# Patient Record
Sex: MALE | Race: WHITE | Employment: UNEMPLOYED | URBAN - METROPOLITAN AREA
[De-identification: names, ages, dates, MRNs, and addresses within clinical notes are randomized per-mention and may not be internally consistent; named-entity substitution may affect disease eponyms.]

---

## 2018-02-16 ENCOUNTER — HOSPITAL ENCOUNTER (EMERGENCY)
Age: 38
Discharge: HOME | End: 2018-02-17
Attending: EMERGENCY MEDICINE

## 2018-02-16 ENCOUNTER — APPOINTMENT (OUTPATIENT)
Dept: GENERAL RADIOLOGY | Age: 38
End: 2018-02-16

## 2018-02-16 DIAGNOSIS — T40.1X1A ACCIDENTAL OVERDOSE OF HEROIN, INITIAL ENCOUNTER (HCC): Primary | ICD-10-CM

## 2018-02-16 LAB
ABSOLUTE EOS #: 0.1 K/UL (ref 0–0.4)
ABSOLUTE IMMATURE GRANULOCYTE: ABNORMAL K/UL (ref 0–0.3)
ABSOLUTE LYMPH #: 2.5 K/UL (ref 1–4.8)
ABSOLUTE MONO #: 0.6 K/UL (ref 0.2–0.8)
BASOPHILS # BLD: 1 % (ref 0–2)
BASOPHILS ABSOLUTE: 0.1 K/UL (ref 0–0.2)
DIFFERENTIAL TYPE: ABNORMAL
EKG ATRIAL RATE: 103 BPM
EKG P AXIS: 45 DEGREES
EKG P-R INTERVAL: 156 MS
EKG Q-T INTERVAL: 356 MS
EKG QRS DURATION: 104 MS
EKG QTC CALCULATION (BAZETT): 466 MS
EKG R AXIS: 16 DEGREES
EKG T AXIS: 47 DEGREES
EKG VENTRICULAR RATE: 103 BPM
EOSINOPHILS RELATIVE PERCENT: 2 % (ref 1–4)
HCT VFR BLD CALC: 48.2 % (ref 41–53)
HEMOGLOBIN: 15.7 G/DL (ref 13.5–17.5)
IMMATURE GRANULOCYTES: ABNORMAL %
LYMPHOCYTES # BLD: 35 % (ref 24–44)
MCH RBC QN AUTO: 27.8 PG (ref 26–34)
MCHC RBC AUTO-ENTMCNC: 32.5 G/DL (ref 31–37)
MCV RBC AUTO: 85.6 FL (ref 80–100)
MONOCYTES # BLD: 8 % (ref 1–7)
NRBC AUTOMATED: ABNORMAL PER 100 WBC
PDW BLD-RTO: 13.9 % (ref 11.5–14.5)
PLATELET # BLD: 185 K/UL (ref 130–400)
PLATELET ESTIMATE: ABNORMAL
PMV BLD AUTO: ABNORMAL FL (ref 6–12)
RBC # BLD: 5.64 M/UL (ref 4.5–5.9)
RBC # BLD: ABNORMAL 10*6/UL
SEG NEUTROPHILS: 54 % (ref 36–66)
SEGMENTED NEUTROPHILS ABSOLUTE COUNT: 3.9 K/UL (ref 1.8–7.7)
WBC # BLD: 7.2 K/UL (ref 3.5–11)
WBC # BLD: ABNORMAL 10*3/UL

## 2018-02-16 PROCEDURE — 93005 ELECTROCARDIOGRAM TRACING: CPT

## 2018-02-16 PROCEDURE — 82553 CREATINE MB FRACTION: CPT

## 2018-02-16 PROCEDURE — 71045 X-RAY EXAM CHEST 1 VIEW: CPT

## 2018-02-16 PROCEDURE — 99285 EMERGENCY DEPT VISIT HI MDM: CPT

## 2018-02-16 PROCEDURE — 85025 COMPLETE CBC W/AUTO DIFF WBC: CPT

## 2018-02-16 PROCEDURE — 80048 BASIC METABOLIC PNL TOTAL CA: CPT

## 2018-02-17 VITALS
DIASTOLIC BLOOD PRESSURE: 51 MMHG | HEIGHT: 77 IN | OXYGEN SATURATION: 95 % | BODY MASS INDEX: 27.16 KG/M2 | SYSTOLIC BLOOD PRESSURE: 117 MMHG | WEIGHT: 230 LBS | HEART RATE: 90 BPM | TEMPERATURE: 98.1 F | RESPIRATION RATE: 18 BRPM

## 2018-02-17 LAB
ANION GAP SERPL CALCULATED.3IONS-SCNC: 18 MMOL/L (ref 9–17)
BUN BLDV-MCNC: 9 MG/DL (ref 6–20)
BUN/CREAT BLD: 8 (ref 9–20)
CALCIUM SERPL-MCNC: 8.7 MG/DL (ref 8.6–10.4)
CHLORIDE BLD-SCNC: 102 MMOL/L (ref 98–107)
CK MB: 1.2 NG/ML
CO2: 24 MMOL/L (ref 20–31)
CREAT SERPL-MCNC: 1.11 MG/DL (ref 0.7–1.2)
GFR AFRICAN AMERICAN: >60 ML/MIN
GFR NON-AFRICAN AMERICAN: >60 ML/MIN
GFR SERPL CREATININE-BSD FRML MDRD: ABNORMAL ML/MIN/{1.73_M2}
GFR SERPL CREATININE-BSD FRML MDRD: ABNORMAL ML/MIN/{1.73_M2}
GLUCOSE BLD-MCNC: 149 MG/DL (ref 70–99)
POTASSIUM SERPL-SCNC: 4 MMOL/L (ref 3.7–5.3)
SODIUM BLD-SCNC: 144 MMOL/L (ref 135–144)

## 2018-02-17 NOTE — ED NOTES
Pt presents to ED via EMS with steady gait transferring to cart c/o of heroin overdose. [place] un[place]. [facility] gave 8mg Narcan IN. Pt became A&O. Upon arrival to ED, pt is A&Ox4. PERRLA. 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 Pt states this is his first time using in over 6 months. Pt states ETOH on board. Pt denie n/v/d/c. Pt denies chest pain or SOB. Respirations are even and non-labored. Skin is pink, warm, and dry. Lungs clear bilateral. Pt placed on 2L O2 NC.       J Carlos Monsivais, RN  02/16/18 [id number]

## 2018-08-20 ENCOUNTER — APPOINTMENT (OUTPATIENT)
Dept: GENERAL RADIOLOGY | Age: 38
End: 2018-08-20

## 2018-08-20 ENCOUNTER — HOSPITAL ENCOUNTER (EMERGENCY)
Age: 38
Discharge: HOME | End: 2018-08-20
Attending: EMERGENCY MEDICINE

## 2018-08-20 VITALS
DIASTOLIC BLOOD PRESSURE: 90 MMHG | HEIGHT: 77 IN | OXYGEN SATURATION: 98 % | SYSTOLIC BLOOD PRESSURE: 142 MMHG | HEART RATE: 86 BPM | WEIGHT: 208 LBS | TEMPERATURE: 98.5 F | BODY MASS INDEX: 24.56 KG/M2 | RESPIRATION RATE: 16 BRPM

## 2018-08-20 DIAGNOSIS — R07.89 CHEST WALL PAIN: Primary | ICD-10-CM

## 2018-08-20 PROCEDURE — 99284 EMERGENCY DEPT VISIT MOD MDM: CPT

## 2018-08-20 PROCEDURE — 6360000002 HC RX W HCPCS: Performed by: NURSE PRACTITIONER

## 2018-08-20 PROCEDURE — 71046 X-RAY EXAM CHEST 2 VIEWS: CPT

## 2018-08-20 PROCEDURE — 96372 THER/PROPH/DIAG INJ SC/IM: CPT

## 2018-08-20 RX ORDER — IBUPROFEN 800 MG/1
800 TABLET ORAL EVERY 8 HOURS PRN
Qty: 20 TABLET | Refills: 0 | Status: SHIPPED | OUTPATIENT
Start: 2018-08-20 | End: 2019-02-11 | Stop reason: ALTCHOICE

## 2018-08-20 RX ORDER — KETOROLAC TROMETHAMINE 30 MG/ML
60 INJECTION, SOLUTION INTRAMUSCULAR; INTRAVENOUS ONCE
Status: COMPLETED | OUTPATIENT
Start: 2018-08-20 | End: 2018-08-20

## 2018-08-20 RX ORDER — ORPHENADRINE CITRATE 30 MG/ML
60 INJECTION INTRAMUSCULAR; INTRAVENOUS ONCE
Status: COMPLETED | OUTPATIENT
Start: 2018-08-20 | End: 2018-08-20

## 2018-08-20 RX ORDER — HYDROCODONE BITARTRATE AND ACETAMINOPHEN 5; 325 MG/1; MG/1
1 TABLET ORAL EVERY 6 HOURS PRN
Qty: 12 TABLET | Refills: 0 | Status: SHIPPED | OUTPATIENT
Start: 2018-08-20 | End: 2018-08-23

## 2018-08-20 RX ORDER — NAPROXEN 500 MG/1
500 TABLET ORAL PRN
Status: ON HOLD | COMMUNITY
End: 2018-09-07 | Stop reason: HOSPADM

## 2018-08-20 RX ORDER — METHOCARBAMOL 750 MG/1
750 TABLET, FILM COATED ORAL 3 TIMES DAILY PRN
Qty: 30 TABLET | Refills: 0 | Status: SHIPPED | OUTPATIENT
Start: 2018-08-20 | End: 2018-08-30

## 2018-08-20 RX ADMIN — KETOROLAC TROMETHAMINE 60 MG: 30 INJECTION, SOLUTION INTRAMUSCULAR at 19:33

## 2018-08-20 RX ADMIN — ORPHENADRINE CITRATE 60 MG: 30 INJECTION INTRAMUSCULAR; INTRAVENOUS at 19:32

## 2018-08-20 ASSESSMENT — PAIN DESCRIPTION - PAIN TYPE: TYPE: ACUTE PAIN

## 2018-08-20 ASSESSMENT — PAIN DESCRIPTION - PROGRESSION: CLINICAL_PROGRESSION: GRADUALLY WORSENING

## 2018-08-20 ASSESSMENT — PAIN DESCRIPTION - DESCRIPTORS: DESCRIPTORS: SHARP

## 2018-08-20 ASSESSMENT — PAIN DESCRIPTION - ONSET: ONSET: ON-GOING

## 2018-08-20 ASSESSMENT — PAIN SCALES - GENERAL
PAINLEVEL_OUTOF10: 4
PAINLEVEL_OUTOF10: 6

## 2018-08-20 ASSESSMENT — PAIN DESCRIPTION - LOCATION: LOCATION: CHEST

## 2018-08-20 ASSESSMENT — PAIN DESCRIPTION - ORIENTATION: ORIENTATION: MID

## 2018-08-20 ASSESSMENT — PAIN DESCRIPTION - FREQUENCY: FREQUENCY: CONTINUOUS

## 2018-08-20 NOTE — ED PROVIDER NOTES
Concern    None     Social History Narrative    None         REVIEW OF SYSTEMS    (2-9 systems for level 4, 10 or more for level 5)     Review of Systems   Cardiovascular: Positive for chest pain. All other systems reviewed and are negative. Except as noted above the remainder of the review of systems was reviewed and negative. PHYSICAL EXAM    (up to 7 for level 4, 8 or more for level 5)     ED Triage Vitals [08/20/18 1826]   BP Temp Temp Source Pulse Resp SpO2 Height Weight   (!) 143/97 98.5 °F (36.9 °C) Oral 86 16 98 % 6' 5\" (1.956 m) 208 lb (94.3 kg)       Physical Exam   Constitutional: He is oriented to person, place, and time. He appears well-developed and well-nourished. HENT:   Head: Normocephalic and atraumatic. Right Ear: External ear normal.   Left Ear: External ear normal.   Nose: Nose normal.   Mouth/Throat: Oropharynx is clear and moist.   Eyes: Pupils are equal, round, and reactive to light. Conjunctivae and EOM are normal.   Neck: Normal range of motion. Neck supple. Pulmonary/Chest: Effort normal and breath sounds normal. He has no decreased breath sounds. He has no wheezes. He has no rhonchi. He exhibits tenderness. He exhibits no crepitus and no edema. Musculoskeletal: Normal range of motion. Neurological: He is alert and oriented to person, place, and time. He has normal strength and normal reflexes. No cranial nerve deficit or sensory deficit. Skin: Skin is warm, dry and intact. No ecchymosis and no rash noted. No erythema. Psychiatric: He has a normal mood and affect.  His behavior is normal. Judgment and thought content normal.         DIAGNOSTIC RESULTS     EKG: All EKG's are interpreted by the Emergency Department Physician who either signs or Co-signs this chart in the absence of a cardiologist.        RADIOLOGY:   Non-plain film images such as CT, Ultrasound and MRI are read by the radiologist. Plain radiographic images are visualized and preliminarily interpreted by the emergency physician with the below findings:    Xr Chest Standard (2 Vw)    Result Date: 8/20/2018  EXAMINATION: TWO VIEWS OF THE CHEST 8/20/2018 7:19 pm COMPARISON: 18 February 2018 HISTORY: ORDERING SYSTEM PROVIDED HISTORY: Chest wall pain ×10 days, accidentally struck in chest TECHNOLOGIST PROVIDED HISTORY: Chest wall pain Ã10 days, accidentally struck in chest Ordering Physician Provided Reason for Exam: chest pain Acuity: Acute Type of Exam: Initial FINDINGS: Heart size is normal.  No vascular congestion, focal consolidation, effusion, or pneumothorax is noted. Osseous and mediastinal structures are age-appropriate. No evidence of acute cardiopulmonary disease. Interpretation per the Radiologist below, if available at the time of this note:    XR CHEST STANDARD (2 VW)   Final Result   No evidence of acute cardiopulmonary disease. ED BEDSIDE ULTRASOUND:   Performed by ED Physician - none    LABS:  Labs Reviewed - No data to display    All other labs were within normal range or not returned as of this dictation. EMERGENCY DEPARTMENT COURSE and DIFFERENTIAL DIAGNOSIS/MDM:   Patient was evaluated in conjunction with attending physician. He exhibits chest wall tenderness palpation to the right chest area. There is no crepitus, erythema or ecchymosis. X-ray of chest per radiologist shows no evidence of acute cardiopulmonary disease. She was given Toradol and Norflex in the ED. He'll be discharged with Motrin, Robaxin and Norco for pain. He was instructed to call number provided to schedule appointment with a [address] [place] [address] primary care physician. Return to ED as needed. Vitals:    Vitals:    08/20/18 1826 08/20/18 1956   BP: (!) 143/97 (!) 142/90   Pulse: 86    Resp: 16    Temp: 98.5 °F (36.9 °C)    TempSrc: Oral    SpO2: 98%    Weight: 208 lb (94.3 kg)    Height: 6' 5\" (1.956 m)          CONSULTS:  None    PROCEDURES:  Procedures    FINAL IMPRESSION      1.  Chest

## 2018-09-04 ENCOUNTER — APPOINTMENT (OUTPATIENT)
Dept: CT IMAGING | Age: 38
DRG: 603 | End: 2018-09-04

## 2018-09-04 ENCOUNTER — HOSPITAL ENCOUNTER (INPATIENT)
Age: 38
LOS: 2 days | Discharge: HOME | DRG: 603 | End: 2018-09-07
Attending: EMERGENCY MEDICINE | Admitting: INTERNAL MEDICINE

## 2018-09-04 DIAGNOSIS — L02.512 ABSCESS OF HAND, LEFT: Primary | ICD-10-CM

## 2018-09-04 DIAGNOSIS — L03.119 CELLULITIS OF HAND: ICD-10-CM

## 2018-09-04 LAB
ANION GAP SERPL CALCULATED.3IONS-SCNC: 8 MMOL/L (ref 9–17)
BUN BLDV-MCNC: 13 MG/DL (ref 6–20)
BUN/CREAT BLD: 17 (ref 9–20)
CALCIUM SERPL-MCNC: 9.5 MG/DL (ref 8.6–10.4)
CHLORIDE BLD-SCNC: 93 MMOL/L (ref 98–107)
CO2: 34 MMOL/L (ref 20–31)
CREAT SERPL-MCNC: 0.77 MG/DL (ref 0.7–1.2)
GFR AFRICAN AMERICAN: >60 ML/MIN
GFR NON-AFRICAN AMERICAN: >60 ML/MIN
GFR SERPL CREATININE-BSD FRML MDRD: ABNORMAL ML/MIN/{1.73_M2}
GFR SERPL CREATININE-BSD FRML MDRD: ABNORMAL ML/MIN/{1.73_M2}
GLUCOSE BLD-MCNC: 101 MG/DL (ref 70–99)
LACTIC ACID: 1.1 MMOL/L (ref 0.5–2.2)
POTASSIUM SERPL-SCNC: 3.6 MMOL/L (ref 3.7–5.3)
SODIUM BLD-SCNC: 135 MMOL/L (ref 135–144)

## 2018-09-04 PROCEDURE — 2500000003 HC RX 250 WO HCPCS: Performed by: EMERGENCY MEDICINE

## 2018-09-04 PROCEDURE — 99284 EMERGENCY DEPT VISIT MOD MDM: CPT

## 2018-09-04 PROCEDURE — 96365 THER/PROPH/DIAG IV INF INIT: CPT

## 2018-09-04 PROCEDURE — 87205 SMEAR GRAM STAIN: CPT

## 2018-09-04 PROCEDURE — 86140 C-REACTIVE PROTEIN: CPT

## 2018-09-04 PROCEDURE — 2580000003 HC RX 258: Performed by: EMERGENCY MEDICINE

## 2018-09-04 PROCEDURE — 6360000004 HC RX CONTRAST MEDICATION: Performed by: EMERGENCY MEDICINE

## 2018-09-04 PROCEDURE — 90715 TDAP VACCINE 7 YRS/> IM: CPT | Performed by: EMERGENCY MEDICINE

## 2018-09-04 PROCEDURE — 83605 ASSAY OF LACTIC ACID: CPT

## 2018-09-04 PROCEDURE — 87186 SC STD MICRODIL/AGAR DIL: CPT

## 2018-09-04 PROCEDURE — 87040 BLOOD CULTURE FOR BACTERIA: CPT

## 2018-09-04 PROCEDURE — 80048 BASIC METABOLIC PNL TOTAL CA: CPT

## 2018-09-04 PROCEDURE — 90471 IMMUNIZATION ADMIN: CPT | Performed by: EMERGENCY MEDICINE

## 2018-09-04 PROCEDURE — 87070 CULTURE OTHR SPECIMN AEROBIC: CPT

## 2018-09-04 PROCEDURE — 73201 CT UPPER EXTREMITY W/DYE: CPT

## 2018-09-04 PROCEDURE — 87077 CULTURE AEROBIC IDENTIFY: CPT

## 2018-09-04 PROCEDURE — 96375 TX/PRO/DX INJ NEW DRUG ADDON: CPT

## 2018-09-04 PROCEDURE — 6360000002 HC RX W HCPCS: Performed by: EMERGENCY MEDICINE

## 2018-09-04 PROCEDURE — 87149 DNA/RNA DIRECT PROBE: CPT

## 2018-09-04 PROCEDURE — 85025 COMPLETE CBC W/AUTO DIFF WBC: CPT

## 2018-09-04 RX ORDER — 0.9 % SODIUM CHLORIDE 0.9 %
80 INTRAVENOUS SOLUTION INTRAVENOUS ONCE
Status: COMPLETED | OUTPATIENT
Start: 2018-09-04 | End: 2018-09-04

## 2018-09-04 RX ORDER — ONDANSETRON 2 MG/ML
4 INJECTION INTRAMUSCULAR; INTRAVENOUS ONCE
Status: COMPLETED | OUTPATIENT
Start: 2018-09-04 | End: 2018-09-04

## 2018-09-04 RX ORDER — SODIUM CHLORIDE 0.9 % (FLUSH) 0.9 %
10 SYRINGE (ML) INJECTION PRN
Status: DISCONTINUED | OUTPATIENT
Start: 2018-09-04 | End: 2018-09-05

## 2018-09-04 RX ORDER — MORPHINE SULFATE 4 MG/ML
4 INJECTION, SOLUTION INTRAMUSCULAR; INTRAVENOUS ONCE
Status: COMPLETED | OUTPATIENT
Start: 2018-09-04 | End: 2018-09-04

## 2018-09-04 RX ADMIN — TETANUS IMMUNE GLOBULIN (HUMAN) 250 UNITS: 250 INJECTION INTRAMUSCULAR at 23:36

## 2018-09-04 RX ADMIN — IOPAMIDOL 75 ML: 755 INJECTION, SOLUTION INTRAVENOUS at 23:17

## 2018-09-04 RX ADMIN — ONDANSETRON 4 MG: 2 INJECTION INTRAMUSCULAR; INTRAVENOUS at 23:00

## 2018-09-04 RX ADMIN — TETANUS TOXOID, REDUCED DIPHTHERIA TOXOID AND ACELLULAR PERTUSSIS VACCINE, ADSORBED 0.5 ML: 5; 2.5; 8; 8; 2.5 SUSPENSION INTRAMUSCULAR at 23:00

## 2018-09-04 RX ADMIN — MORPHINE SULFATE 4 MG: 4 INJECTION, SOLUTION INTRAMUSCULAR; INTRAVENOUS at 23:00

## 2018-09-04 RX ADMIN — SODIUM CHLORIDE 80 ML: 9 INJECTION, SOLUTION INTRAVENOUS at 23:17

## 2018-09-04 RX ADMIN — HYDROMORPHONE HYDROCHLORIDE 1 MG: 1 INJECTION, SOLUTION INTRAMUSCULAR; INTRAVENOUS; SUBCUTANEOUS at 23:43

## 2018-09-04 RX ADMIN — TAZOBACTAM SODIUM AND PIPERACILLIN SODIUM 3.38 G: 375; 3 INJECTION, SOLUTION INTRAVENOUS at 23:36

## 2018-09-04 RX ADMIN — Medication 10 ML: at 23:17

## 2018-09-04 ASSESSMENT — PAIN DESCRIPTION - ORIENTATION: ORIENTATION: LEFT

## 2018-09-04 ASSESSMENT — PAIN SCALES - GENERAL
PAINLEVEL_OUTOF10: 7

## 2018-09-04 ASSESSMENT — PAIN DESCRIPTION - PAIN TYPE: TYPE: ACUTE PAIN

## 2018-09-04 ASSESSMENT — PAIN DESCRIPTION - LOCATION: LOCATION: HAND

## 2018-09-05 PROBLEM — L03.90 CELLULITIS: Status: ACTIVE | Noted: 2018-09-05

## 2018-09-05 PROBLEM — L02.519 CELLULITIS AND ABSCESS OF HAND: Status: ACTIVE | Noted: 2018-09-05

## 2018-09-05 PROBLEM — F17.200 SMOKER: Status: ACTIVE | Noted: 2018-09-05

## 2018-09-05 PROBLEM — D72.829 LEUKOCYTOSIS: Status: ACTIVE | Noted: 2018-09-05

## 2018-09-05 PROBLEM — M71.042 ABSCESS OF BURSA OF LEFT HAND: Status: ACTIVE | Noted: 2018-09-05

## 2018-09-05 PROBLEM — L03.119 CELLULITIS AND ABSCESS OF HAND: Status: ACTIVE | Noted: 2018-09-05

## 2018-09-05 PROBLEM — E87.6 HYPOKALEMIA: Status: ACTIVE | Noted: 2018-09-05

## 2018-09-05 LAB
ABSOLUTE EOS #: 0 K/UL (ref 0–0.4)
ABSOLUTE IMMATURE GRANULOCYTE: ABNORMAL K/UL (ref 0–0.3)
ABSOLUTE LYMPH #: 2.07 K/UL (ref 1–4.8)
ABSOLUTE MONO #: 1.43 K/UL (ref 0.2–0.8)
BASOPHILS # BLD: 0 %
BASOPHILS ABSOLUTE: 0 K/UL (ref 0–0.2)
C-REACTIVE PROTEIN: 124.2 MG/L (ref 0–5)
DIFFERENTIAL TYPE: ABNORMAL
DIRECT EXAM: NORMAL
EOSINOPHILS RELATIVE PERCENT: 0 % (ref 1–4)
HCT VFR BLD CALC: 44.2 % (ref 41–53)
HEMOGLOBIN: 14.3 G/DL (ref 13.5–17.5)
IMMATURE GRANULOCYTES: ABNORMAL %
LYMPHOCYTES # BLD: 13 % (ref 24–44)
Lab: NORMAL
MCH RBC QN AUTO: 27 PG (ref 26–34)
MCHC RBC AUTO-ENTMCNC: 32.4 G/DL (ref 31–37)
MCV RBC AUTO: 83.3 FL (ref 80–100)
MONOCYTES # BLD: 9 % (ref 1–7)
NRBC AUTOMATED: ABNORMAL PER 100 WBC
PDW BLD-RTO: 15.3 % (ref 11.5–14.5)
PLATELET # BLD: 303 K/UL (ref 130–400)
PLATELET ESTIMATE: ABNORMAL
PMV BLD AUTO: 8.1 FL (ref 6–12)
RBC # BLD: 5.31 M/UL (ref 4.5–5.9)
RBC # BLD: ABNORMAL 10*6/UL
SEG NEUTROPHILS: 78 % (ref 36–66)
SEGMENTED NEUTROPHILS ABSOLUTE COUNT: 12.4 K/UL (ref 1.8–7.7)
SPECIMEN DESCRIPTION: NORMAL
STATUS: NORMAL
VANCOMYCIN TROUGH DATE LAST DOSE: ABNORMAL
VANCOMYCIN TROUGH DOSE AMOUNT: ABNORMAL
VANCOMYCIN TROUGH TIME LAST DOSE: ABNORMAL
VANCOMYCIN TROUGH: 21.4 UG/ML (ref 10–20)
WBC # BLD: 15.9 K/UL (ref 3.5–11)
WBC # BLD: ABNORMAL 10*3/UL

## 2018-09-05 PROCEDURE — 99253 IP/OBS CNSLTJ NEW/EST LOW 45: CPT | Performed by: INTERNAL MEDICINE

## 2018-09-05 PROCEDURE — 6360000002 HC RX W HCPCS: Performed by: INTERNAL MEDICINE

## 2018-09-05 PROCEDURE — 1200000000 HC SEMI PRIVATE

## 2018-09-05 PROCEDURE — 6370000000 HC RX 637 (ALT 250 FOR IP): Performed by: NURSE PRACTITIONER

## 2018-09-05 PROCEDURE — 2580000003 HC RX 258: Performed by: NURSE PRACTITIONER

## 2018-09-05 PROCEDURE — 99222 1ST HOSP IP/OBS MODERATE 55: CPT | Performed by: INTERNAL MEDICINE

## 2018-09-05 PROCEDURE — 2500000003 HC RX 250 WO HCPCS: Performed by: NURSE PRACTITIONER

## 2018-09-05 PROCEDURE — 36415 COLL VENOUS BLD VENIPUNCTURE: CPT

## 2018-09-05 PROCEDURE — 6360000002 HC RX W HCPCS: Performed by: NURSE PRACTITIONER

## 2018-09-05 PROCEDURE — 96375 TX/PRO/DX INJ NEW DRUG ADDON: CPT

## 2018-09-05 PROCEDURE — 6360000002 HC RX W HCPCS: Performed by: EMERGENCY MEDICINE

## 2018-09-05 PROCEDURE — 80202 ASSAY OF VANCOMYCIN: CPT

## 2018-09-05 PROCEDURE — 2580000003 HC RX 258: Performed by: EMERGENCY MEDICINE

## 2018-09-05 RX ORDER — ONDANSETRON 4 MG/1
4 TABLET, ORALLY DISINTEGRATING ORAL EVERY 6 HOURS PRN
Status: DISCONTINUED | OUTPATIENT
Start: 2018-09-05 | End: 2018-09-07 | Stop reason: HOSPADM

## 2018-09-05 RX ORDER — POTASSIUM CHLORIDE 20MEQ/15ML
40 LIQUID (ML) ORAL PRN
Status: DISCONTINUED | OUTPATIENT
Start: 2018-09-05 | End: 2018-09-07 | Stop reason: HOSPADM

## 2018-09-05 RX ORDER — ONDANSETRON 2 MG/ML
4 INJECTION INTRAMUSCULAR; INTRAVENOUS EVERY 6 HOURS PRN
Status: DISCONTINUED | OUTPATIENT
Start: 2018-09-05 | End: 2018-09-07 | Stop reason: HOSPADM

## 2018-09-05 RX ORDER — POTASSIUM CHLORIDE 20 MEQ/1
40 TABLET, EXTENDED RELEASE ORAL PRN
Status: DISCONTINUED | OUTPATIENT
Start: 2018-09-05 | End: 2018-09-07 | Stop reason: HOSPADM

## 2018-09-05 RX ORDER — SODIUM CHLORIDE 0.9 % (FLUSH) 0.9 %
10 SYRINGE (ML) INJECTION EVERY 12 HOURS SCHEDULED
Status: DISCONTINUED | OUTPATIENT
Start: 2018-09-05 | End: 2018-09-07 | Stop reason: HOSPADM

## 2018-09-05 RX ORDER — BISACODYL 10 MG
10 SUPPOSITORY, RECTAL RECTAL DAILY PRN
Status: DISCONTINUED | OUTPATIENT
Start: 2018-09-05 | End: 2018-09-07 | Stop reason: HOSPADM

## 2018-09-05 RX ORDER — MAGNESIUM SULFATE 1 G/100ML
1 INJECTION INTRAVENOUS PRN
Status: DISCONTINUED | OUTPATIENT
Start: 2018-09-05 | End: 2018-09-07 | Stop reason: HOSPADM

## 2018-09-05 RX ORDER — ONDANSETRON 2 MG/ML
4 INJECTION INTRAMUSCULAR; INTRAVENOUS EVERY 6 HOURS PRN
Status: DISCONTINUED | OUTPATIENT
Start: 2018-09-05 | End: 2018-09-05 | Stop reason: SDUPTHER

## 2018-09-05 RX ORDER — ACETAMINOPHEN 325 MG/1
650 TABLET ORAL EVERY 4 HOURS PRN
Status: DISCONTINUED | OUTPATIENT
Start: 2018-09-05 | End: 2018-09-07 | Stop reason: HOSPADM

## 2018-09-05 RX ORDER — HYDROCODONE BITARTRATE AND ACETAMINOPHEN 5; 325 MG/1; MG/1
2 TABLET ORAL EVERY 4 HOURS PRN
Status: DISCONTINUED | OUTPATIENT
Start: 2018-09-05 | End: 2018-09-06

## 2018-09-05 RX ORDER — SODIUM CHLORIDE 9 MG/ML
INJECTION, SOLUTION INTRAVENOUS CONTINUOUS
Status: DISCONTINUED | OUTPATIENT
Start: 2018-09-05 | End: 2018-09-05

## 2018-09-05 RX ORDER — NICOTINE 21 MG/24HR
1 PATCH, TRANSDERMAL 24 HOURS TRANSDERMAL DAILY PRN
Status: DISCONTINUED | OUTPATIENT
Start: 2018-09-05 | End: 2018-09-07 | Stop reason: HOSPADM

## 2018-09-05 RX ORDER — SODIUM CHLORIDE 0.9 % (FLUSH) 0.9 %
10 SYRINGE (ML) INJECTION PRN
Status: DISCONTINUED | OUTPATIENT
Start: 2018-09-05 | End: 2018-09-07 | Stop reason: HOSPADM

## 2018-09-05 RX ORDER — POTASSIUM CHLORIDE 7.45 MG/ML
10 INJECTION INTRAVENOUS PRN
Status: DISCONTINUED | OUTPATIENT
Start: 2018-09-05 | End: 2018-09-07 | Stop reason: HOSPADM

## 2018-09-05 RX ORDER — SODIUM CHLORIDE AND POTASSIUM CHLORIDE .9; .15 G/100ML; G/100ML
SOLUTION INTRAVENOUS CONTINUOUS
Status: DISCONTINUED | OUTPATIENT
Start: 2018-09-05 | End: 2018-09-06

## 2018-09-05 RX ORDER — HYDROCODONE BITARTRATE AND ACETAMINOPHEN 5; 325 MG/1; MG/1
1 TABLET ORAL EVERY 4 HOURS PRN
Status: DISCONTINUED | OUTPATIENT
Start: 2018-09-05 | End: 2018-09-06

## 2018-09-05 RX ADMIN — HYDROCODONE BITARTRATE AND ACETAMINOPHEN 1 TABLET: 5; 325 TABLET ORAL at 09:45

## 2018-09-05 RX ADMIN — HYDROCODONE BITARTRATE AND ACETAMINOPHEN 2 TABLET: 5; 325 TABLET ORAL at 18:36

## 2018-09-05 RX ADMIN — POTASSIUM CHLORIDE AND SODIUM CHLORIDE: 900; 150 INJECTION, SOLUTION INTRAVENOUS at 10:24

## 2018-09-05 RX ADMIN — TAZOBACTAM SODIUM AND PIPERACILLIN SODIUM 3.38 G: 375; 3 INJECTION, SOLUTION INTRAVENOUS at 08:20

## 2018-09-05 RX ADMIN — TAZOBACTAM SODIUM AND PIPERACILLIN SODIUM 3.38 G: 375; 3 INJECTION, SOLUTION INTRAVENOUS at 23:00

## 2018-09-05 RX ADMIN — VANCOMYCIN HYDROCHLORIDE 1500 MG: 1 INJECTION, POWDER, LYOPHILIZED, FOR SOLUTION INTRAVENOUS at 11:47

## 2018-09-05 RX ADMIN — VANCOMYCIN HYDROCHLORIDE 1500 MG: 1 INJECTION, POWDER, LYOPHILIZED, FOR SOLUTION INTRAVENOUS at 00:07

## 2018-09-05 RX ADMIN — SODIUM CHLORIDE: 9 INJECTION, SOLUTION INTRAVENOUS at 03:36

## 2018-09-05 RX ADMIN — HYDROCODONE BITARTRATE AND ACETAMINOPHEN 2 TABLET: 5; 325 TABLET ORAL at 05:41

## 2018-09-05 RX ADMIN — TAZOBACTAM SODIUM AND PIPERACILLIN SODIUM 3.38 G: 375; 3 INJECTION, SOLUTION INTRAVENOUS at 16:20

## 2018-09-05 RX ADMIN — HYDROCODONE BITARTRATE AND ACETAMINOPHEN 2 TABLET: 5; 325 TABLET ORAL at 23:01

## 2018-09-05 RX ADMIN — HYDROCODONE BITARTRATE AND ACETAMINOPHEN 2 TABLET: 5; 325 TABLET ORAL at 14:23

## 2018-09-05 RX ADMIN — ENOXAPARIN SODIUM 40 MG: 40 INJECTION SUBCUTANEOUS at 09:40

## 2018-09-05 ASSESSMENT — PAIN SCALES - GENERAL
PAINLEVEL_OUTOF10: 6
PAINLEVEL_OUTOF10: 8
PAINLEVEL_OUTOF10: 7
PAINLEVEL_OUTOF10: 4
PAINLEVEL_OUTOF10: 7
PAINLEVEL_OUTOF10: 7
PAINLEVEL_OUTOF10: 8
PAINLEVEL_OUTOF10: 8

## 2018-09-05 ASSESSMENT — PAIN DESCRIPTION - ONSET
ONSET: ON-GOING

## 2018-09-05 ASSESSMENT — PAIN DESCRIPTION - DESCRIPTORS
DESCRIPTORS: ACHING;DISCOMFORT
DESCRIPTORS: ACHING;DISCOMFORT
DESCRIPTORS: BURNING;CONSTANT;SHARP

## 2018-09-05 ASSESSMENT — PAIN DESCRIPTION - LOCATION
LOCATION: HAND

## 2018-09-05 ASSESSMENT — PAIN DESCRIPTION - PROGRESSION
CLINICAL_PROGRESSION: NOT CHANGED

## 2018-09-05 ASSESSMENT — PAIN DESCRIPTION - ORIENTATION
ORIENTATION: LEFT
ORIENTATION: LEFT;POSTERIOR
ORIENTATION: LEFT

## 2018-09-05 ASSESSMENT — PAIN DESCRIPTION - PAIN TYPE
TYPE: ACUTE PAIN

## 2018-09-05 ASSESSMENT — PAIN DESCRIPTION - FREQUENCY
FREQUENCY: CONTINUOUS

## 2018-09-05 NOTE — ED PROVIDER NOTES
[address] [place] [address] ED  eMERGENCY dEPARTMENT eNCOUnter      Pt Name: Papito Guido  MRN: [id number]  [place] 1980  Date of evaluation: 9/4/2018  Provider: Alyssa Pepper MD    CHIEF COMPLAINT       Chief Complaint   Patient presents with    Insect Bite     left hand          HISTORY OF PRESENT ILLNESS  (Location/Symptom, Timing/Onset, Context/Setting, Quality, Duration, Modifying Factors, Severity.)   Papito Guido is a 43 y.o. male who presents to the emergency department For evaluation of left hand infection. Patient has extensive abscess and cellulitis on the dorsum of the left hand. The overlying area is black and necrotic. He states it became this way within a 2 day time period. 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 He is not certain what bit him. He states initially the hand was just red but it seemed to \"balloon\" just over the last 24 hours. Nursing Notes were reviewed. ALLERGIES     Sulfa antibiotics    CURRENT MEDICATIONS       Previous Medications    IBUPROFEN (ADVIL;MOTRIN) 800 MG TABLET    Take 1 tablet by mouth every 8 hours as needed for Pain    NAPROXEN (NAPROSYN) 500 MG TABLET    Take 500 mg by mouth as needed for Pain       PAST MEDICAL HISTORY   History reviewed. No pertinent past medical history. SURGICAL HISTORY           Procedure Laterality Date    ADENOIDECTOMY      HYDROCELE EXCISION Left          FAMILY HISTORY     History reviewed. No pertinent family history. No family status information on file. SOCIAL HISTORY      reports that he has been smoking Cigarettes. He has been smoking about 1.00 pack per day. He has never used smokeless tobacco. He reports that he drinks alcohol. He reports that he uses drugs, including Marijuana. REVIEW OF SYSTEMS    (2-9 systems for level 4, 10 or more for level 5)     Review of Systems   All other systems reviewed and are negative.        Except as noted above the remainder of the review of systems tissues on the dorsal aspect of the   hand.         CT obtained after initial drainage of an excessive amount of purulence. 3 cm abscess remaining    Interpretation per the Radiologist below, if available at the time of this note:    CT HAND LEFT W CONTRAST   Preliminary Result   1. 31 mm superficial abscess in the soft tissues on the dorsal aspect of the   hand. LABS:  Labs Reviewed   CBC WITH AUTO DIFFERENTIAL - Abnormal; Notable for the following:        Result Value    WBC 15.9 (*)     RDW 15.3 (*)     All other components within normal limits   BASIC METABOLIC PANEL - Abnormal; Notable for the following:     Glucose 101 (*)     Potassium 3.6 (*)     Chloride 93 (*)     CO2 34 (*)     Anion Gap 8 (*)     All other components within normal limits   WOUND CULTURE   CULTURE BLOOD #1   CULTURE BLOOD #1   LACTIC ACID   C-REACTIVE PROTEIN     Results for Nusrat Pierce (MRN [id number]) as of 9/4/2018 23:57   Ref.  Range 9/4/2018 22:59 9/4/2018 23:15   Sodium Latest Ref Range: 135 - 144 mmol/L 135    Potassium Latest Ref Range: 3.7 - 5.3 mmol/L 3.6 (L)    Chloride Latest Ref Range: 98 - 107 mmol/L 93 (L)    CO2 Latest Ref Range: 20 - 31 mmol/L 34 (H)    BUN Latest Ref Range: 6 - 20 mg/dL 13    Creatinine Latest Ref Range: 0.70 - 1.20 mg/dL 0.77    Bun/Cre Ratio Latest Ref Range: 9 - 20  17    Anion Gap Latest Ref Range: 9 - 17 mmol/L 8 (L)    GFR Non- Latest Ref Range: >60 mL/min >60    GFR African American Latest Ref Range: >60 mL/min >60    Glucose Latest Ref Range: 70 - 99 mg/dL 101 (H)    Calcium Latest Ref Range: 8.6 - 10.4 mg/dL 9.5    GFR Comment Unknown Pend    GFR Staging Unknown NOT REPORTED    WBC Latest Ref Range: 3.5 - 11.0 k/uL 15.9 (H)    RBC Latest Ref Range: 4.5 - 5.9 m/uL 5.31    Hemoglobin Quant Latest Ref Range: 13.5 - 17.5 g/dL 14.3    Hematocrit Latest Ref Range: 41 - 53 % 44.2    MCV Latest Ref Range: 80 - 100 fL 83.3    MCH Latest Ref Range: 26 - 34 pg 27.0    MCHC Latest

## 2018-09-05 NOTE — H&P
[facility]    HISTORY AND PHYSICAL EXAMINATION            Date:   9/5/2018  Patient name:  Doyal Oppenheim  Date of admission:  9/4/2018 10:35 PM  MRN:   [id number]  Account:  [de-identified]  YOB: 1980  PCP:    No primary care provider on file. Room:   [place] [address]  Code Status:    Full Code    Chief Complaint:     Chief Complaint   Patient presents with   [address]     left hand        History Obtained From:     patient, electronic medical record    History of Present Illness: The patient is a 43 y.o. Non-/non  male who presents with Insect Bite (left hand )   and he is admitted to the hospital for the management of left hand infection. Per ED:  Doyal Oppenheim is a 43 y.o. male who presents to the emergency department For evaluation of left hand infection. Patient has extensive abscess and cellulitis on the dorsum of the left hand. The overlying area is black and necrotic. He states it became this way within a 2 day time period. 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 He is not certain what bit him. He states initially the hand was just red but it seemed to \"balloon\" just over the last 24 hours    The patient presented to the ED with complaints of pain and swelling of the left hand. He thinks it could have been an insect bite but not sure. Rapidly got worse over past 2 days as outlined above. Never had anything like this before. No chronic medical problems. He does smoke. Abscess of the left hand incised last night by ED. CT of hand completed. Still with pain. No fever. IV Vanco and Zosyn started. Spoke with nursing staff in the ED room. Past Medical History:     History reviewed. No pertinent past medical history.      Past Surgical History:     Past Surgical History:   Procedure Laterality Date    ADENOIDECTOMY      HYDROCELE EXCISION Left         Medications Prior to Admission:     Prior to Admission medications    Medication Sig Start Date End Date Taking? Authorizing Provider   naproxen (NAPROSYN) 500 MG tablet Take 500 mg by mouth as needed for Pain    Historical Provider, MD   ibuprofen (ADVIL;MOTRIN) 800 MG tablet Take 1 tablet by mouth every 8 hours as needed for Pain 8/20/18   SHILO Marley - CNP        Allergies:     Sulfa antibiotics    Social History:     Tobacco:    reports that he has been smoking Cigarettes. He has been smoking about 1.00 pack per day. He has never used smokeless tobacco.  Alcohol:      reports that he drinks alcohol. Drug Use:  reports that he uses drugs, including Marijuana. Family History:     History reviewed. No pertinent family history. Review of Systems:     Positive and Negative as described in HPI. CONSTITUTIONAL:  negative for fevers, chills, sweats, fatigue, weight loss  HEENT:  negative for vision, hearing changes, runny nose, throat pain  RESPIRATORY:  negative for shortness of breath, cough, congestion, wheezing. CARDIOVASCULAR:  negative for chest pain, palpitations.   GASTROINTESTINAL:  negative for nausea, vomiting, diarrhea, constipation, change in bowel habits, abdominal pain   GENITOURINARY:  negative for difficulty of urination, burning with urination, frequency   INTEGUMENT:  Painful, red bite lesion back of left hand   HEMATOLOGIC/LYMPHATIC:  negative for swelling/edema   ALLERGIC/IMMUNOLOGIC:  negative for urticaria , itching  ENDOCRINE:  negative increase in drinking, increase in urination, hot or cold intolerance  MUSCULOSKELETAL:  negative joint pains, muscle aches, swelling of joints  NEUROLOGICAL:  negative for headaches, dizziness, lightheadedness, numbness, pain, tingling extremities; right hand dominant  BEHAVIOR/PSYCH:  negative for depression, anxiety    Physical Exam:   BP (!) 146/77   Pulse 96   Temp 98.6 °F (37 °C) (Oral)   Resp 18   Ht 6' 5\" (1.956 m)   Wt 202 lb 2 oz (91.7 kg)   SpO2 98% BMI 23.97 kg/m²   Temp (24hrs), Av.6 °F (37 °C), Min:98.6 °F (37 °C), Max:98.6 °F (37 °C)    No results for input(s): POCGLU in the last 72 hours. No intake or output data in the 24 hours ending 18 0926    General Appearance:  alert, well appearing, and in no acute distress; seen in ED with nursing staff. Mental status: oriented to person, place, and time with somewhat withdrawn affect  Head:  normocephalic, atraumatic. Eye: no icterus, redness, pupils equal and reactive, extraocular eye movements intact, conjunctiva clear  Ear: normal external ear, no discharge, hearing intact  Nose:  no drainage noted  Mouth: mucous membranes moist  Neck: supple, no carotid bruits, thyroid not palpable  Lungs: Bilateral equal air entry, clear to ausculation, no wheezing, rales or rhonchi, normal effort  Cardiovascular: normal rate, regular rhythm, no murmur, gallop, rub. Abdomen: Soft, nontender, nondistended, normal bowel sounds, no hepatomegaly or splenomegaly  Neurologic: There are no new focal motor or sensory deficits, normal muscle tone and bulk, no abnormal sensation, normal speech, cranial nerves II through XII grossly intact  Skin: I removed dressings on the left hand and copious amount of foul smelling puss immediately drained out. He is able to  the hand--with pain. Significant increased warmth and erythema. See image attached. Necrotic tissue noted.   Extremities:  peripheral pulses palpable, no pedal edema or calf pain with palpation  Psych: normal affect             Investigations:      Laboratory Testing:  Recent Results (from the past 24 hour(s))   CBC Auto Differential    Collection Time: 18 10:59 PM   Result Value Ref Range    WBC 15.9 (H) 3.5 - 11.0 k/uL    RBC 5.31 4.5 - 5.9 m/uL    Hemoglobin 14.3 13.5 - 17.5 g/dL    Hematocrit 44.2 41 - 53 %    MCV 83.3 80 - 100 fL    MCH 27.0 26 - 34 pg    MCHC 32.4 31 - 37 g/dL    RDW 15.3 (H) 11.5 - 14.5 %    Platelets 173 477 - 649 k/uL    MPV 8.1 6.0 - 12.0 fL    NRBC Automated NOT REPORTED per 100 WBC    Differential Type NOT REPORTED     Immature Granulocytes NOT REPORTED 0 %    Absolute Immature Granulocyte NOT REPORTED 0.00 - 0.30 k/uL    WBC Morphology NOT REPORTED     RBC Morphology NOT REPORTED     Platelet Estimate NOT REPORTED     Seg Neutrophils 78 (H) 36 - 66 %    Lymphocytes 13 (L) 24 - 44 %    Monocytes 9 (H) 1 - 7 %    Eosinophils % 0 (L) 1 - 4 %    Basophils 0 %    Segs Absolute 12.40 (H) 1.8 - 7.7 k/uL    Absolute Lymph # 2.07 1.0 - 4.8 k/uL    Absolute Mono # 1.43 (H) 0.2 - 0.8 k/uL    Absolute Eos # 0.00 0.0 - 0.4 k/uL    Basophils # 0.00 0.0 - 0.2 k/uL   Basic Metabolic Panel    Collection Time: 09/04/18 10:59 PM   Result Value Ref Range    Glucose 101 (H) 70 - 99 mg/dL    BUN 13 6 - 20 mg/dL    CREATININE 0.77 0.70 - 1.20 mg/dL    Bun/Cre Ratio 17 9 - 20    Calcium 9.5 8.6 - 10.4 mg/dL    Sodium 135 135 - 144 mmol/L    Potassium 3.6 (L) 3.7 - 5.3 mmol/L    Chloride 93 (L) 98 - 107 mmol/L    CO2 34 (H) 20 - 31 mmol/L    Anion Gap 8 (L) 9 - 17 mmol/L    GFR Non-African American >60 >60 mL/min    GFR African American >60 >60 mL/min    GFR Comment          GFR Staging NOT REPORTED    Lactic Acid    Collection Time: 09/04/18 10:59 PM   Result Value Ref Range    Lactic Acid 1.1 0.5 - 2.2 mmol/L   C-Reactive Protein    Collection Time: 09/04/18 10:59 PM   Result Value Ref Range    .2 (H) 0.0 - 5.0 mg/L   VANCOMYCIN, TROUGH    Collection Time: 09/05/18  3:41 AM   Result Value Ref Range    Vancomycin Tr 21.4 (HH) 10.0 - 20.0 ug/mL    Vancomycin Trough Dose amount NOT REPORTED     Vancomycin Trough Date last dose NOT REPORTED     Vancomycin Trough Time last dose NOT REPORTED    Wound Gram stain    Collection Time: 09/05/18  6:44 AM   Result Value Ref Range    Specimen Description . WOUND     Special Requests NOT REPORTED     Direct Exam DUPLICATE ORDER     Status FINAL 09/05/2018        Imaging/Diagnostics:    EXAMINATION: CT OF THE LEFT HAND

## 2018-09-05 NOTE — PROGRESS NOTES
effects of the drug and do not get any benefit from it. Moreover, taking an antibiotic when it is not needed can lead to the development of antibiotic resistance. When resistance develops, antibiotics may not be able to stop future infections. Every time someone takes an antibiotic they dont need, they increase their risk of developing a resistant infection in the future. Types of Adverse Drug Events Related to Antibiotics  Allergic Reactions  Every year, there are more than 140,000 emergency department visits for reactions to antibiotics. Almost four out of five (79%) emergency department visits for antibiotic-related adverse drug events are due to an allergic reaction. These reactions can range from mild rashes and itching to serious blistering skin reactions swelling of the face and throat, and breathing problems. Minimizing unnecessary antibiotic use is the best way to reduce the risk of adverse drug events from antibiotics. Patients should tell their doctors about any past drug reactions or allergies. C. difficile  C. difficile causes diarrhea linked to at least 14,000 [place] deaths each year. When a person takes antibiotics, good bacteria that protect against infection are destroyed for several months. During this time, patients can get sick from C. difficile picked up from contaminated surfaces or spread from a healthcare providers hands. Those most at risk are people, especially older adults, who take antibiotics and also get medical care. Take antibiotics exactly and only as prescribed. Drug Interactions and Side Effects  Antibiotics can interact with other drugs patients take, making those drugs or the antibiotics less effective. Some drug combinations can worsen the side effects of the antibiotic or other drug. Common side effects of antibiotics include nausea, diarrhea, and stomach pain. Sometimes these symptoms can lead to dehydration and other problems.  Patients should ask their doctors about drug interactions and the potential side effects of antibiotics.  The doctor should be told immediately if a patient has any side effects from antibiotics  Page last updated: February 24, 2017 Content source:   [employer] and [employer] for Emerging and Zoonotic Infectious Diseases ([place]an [place])  Division of Healthcare Quality Promotion [facility], JOHN DOMINGUEZ

## 2018-09-05 NOTE — ED NOTES
Admission bed obtained phone report given and patient being prepared for transfer.       Valentina Finch RN  09/05/18 [id number]

## 2018-09-05 NOTE — ED NOTES
Pt presents to the ed via private auto c/o abscess to his left hand. Pt states he believes he may have been bitten by a spider in a basement. Redness, edema, drainage, and narcotic tissue is noted. Drainage is odorous green and yellow in color, pt denies fever or chills vitals are stable at this time.      Aiden Garcias RN  09/05/18 [address] Rigoberto Olivarez RN  09/05/18 8032

## 2018-09-05 NOTE — CONSULTS
Inpatient consult to Infectious Diseases  Consult performed by: Sharmon Angelucci ordered by: Lucio Allison          [facility]  Initial Consult Note  Date: 9/5/2018    Impression:   · Left dorsal hand necrotizing soft tissue infection with associated inflammatory changes/cellulitis  · Etiology is not entirely clear and could be related to an insect bite with toxins versus an infectious process    Recommendations   · I agree with empiric antimicrobial therapy with Zosyn and vancomycin especially given that the patient has gram-positive cocci and gram-negative rods seen on the Gram stain. · Plastic surgery has been consulted and the patient will likely need surgical debridement. · Tissue from surgical debridement should be sent for culture and pathological testing. · I'll follow his progress and adjust therapy accordingly    Chief complaint/reason for consultation:   Left dorsal hand infection    History of Present Illness:   Wilber Salguero is a 43y.o.-year-old male who was initially admitted on 9/4/2018. Torey Schmitz has no real significant past history and 3 days ago he woke up and noticed a left dorsal hand necrotic bullous area appear with no antecedent trauma. He reports progressive soft tissue swelling, redness and necrosis area over 24 hours. He does not report any prior episodes and has no history of MRSA skin and soft tissue infections. 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 He denies any witnessed spider bite. He did not have any associated fever, chills, or evidence of lymphangitis. He came into the emergency room has been admitted started on broad-spectrum antimicrobial therapy and I was asked to evaluate and help with antibiotic choice    I have personally reviewed the past medical history, past surgical history, medications, social history, and family history, and I have updated the database accordingly.   Past Medical History:   History reviewed. No pertinent past medical history. Past Surgical  History:     Past Surgical History:   Procedure Laterality Date    ADENOIDECTOMY      HYDROCELE EXCISION Left      Medications:      sodium chloride flush  10 mL Intravenous 2 times per day    enoxaparin  40 mg Subcutaneous Daily    vancomycin  1,500 mg Intravenous Q12H    piperacillin-tazobactam  3.375 g Intravenous Q8H    vancomycin (VANCOCIN) intermittent dosing (placeholder)   Other RX Placeholder     Social History:     Social History     Social History    Marital status:      Spouse name: N/A    Number of children: N/A    Years of education: N/A     Occupational History    Not on file. Social History Main Topics    Smoking status: Current Every Day Smoker     Packs/day: 1.00     Types: Cigarettes    Smokeless tobacco: Never Used    Alcohol use Yes      Comment: occ    Drug use: Yes     Types: Marijuana    Sexual activity: Not on file     Other Topics Concern    Not on file     Social History Narrative    No narrative on file     Family History:   History reviewed. No pertinent family history. Allergies:   Sulfa antibiotics     Review of Systems:   General: No fevers or chills. Eyes: No double vision or blurry vision. ENT: No sore throat or runny nose. Cardiovascular: No chest pain or palpitations. Lung: No shortness of breath or cough. Abdomen: No nausea, vomiting, diarrhea, or abdominal pain. Genitourinary: No increased urinary frequency, or dysuria. Musculoskeletal: There is a left dorsal hand necrotic bullous area that has progressively worsened with soft tissue redness and swelling  Hematologic: No bleeding or bruising. Neurologic: No headache, weakness, numbness, or tingling.     Physical Examination :   /79   Pulse 83   Temp 98.6 °F (37 °C) (Oral)   Resp 16   Ht 6' 5\" (1.956 m)   Wt 202 lb 2 oz (91.7 kg)   SpO2 97%   BMI 23.97 kg/m²     Temperature Range: Temp: 98.6 °F (37 °C) Temp  Av.4 °F (36.9 °C)  Min: 98.1 °F (36.7 °C)  Max: 98.6 °F (37 °C)  General Appearance: Awake, alert, and in no apparent distress  Head: Normocephalic, without obvious abnormality, atraumatic  Eyes: Pupils equal, round, reactive, to light and accommodation; extraocular movements intact; sclera anicteric; conjunctivae pink  ENT: Oropharynx clear, without erythema, exudate, or thrush. Neck: Supple, without lymphadenopathy. Pulmonary/Chest: Clear to auscultation, without wheezes, rales, or rhonchi  Cardiovascular: Regular rate and rhythm without murmurs, rubs, or gallops. Abdomen: Soft, nontender, nondistended. Extremities: No cyanosis, clubbing, edema, or effusions. Neurologic: No gross sensory or motor deficits. Skin: Left hand dorsal necrotic soft tissue skin changes with surrounding erythroderma       Medical Decision Making:   I have independently reviewed/ordered the following labs:  CBC with Differential:   Recent Labs      18   WBC  15.9*   HGB  14.3   HCT  44.2   PLT  303   LYMPHOPCT  13*   MONOPCT  9*     BMP:   Recent Labs      18   NA  135   K  3.6*   CL  93*   CO2  34*   BUN  13   CREATININE  0.77     Hepatic Function Panel: No results for input(s): PROT, LABALBU, BILIDIR, IBILI, BILITOT, ALKPHOS, ALT, AST in the last 72 hours. Lab Results   Component Value Date    .2 (H) 2018     No results found for: SEDRATE    Imaging Studies:   CT OF THE LEFT HAND WITH CONTRAST 2018 11:18 pm  Impression   1. 31 mm superficial abscess in the soft tissues on the dorsal aspect of the   hand.           Cultures:     Culture Blood #1 [[id number]] Collected: 18   Order Status: Completed Specimen: Blood Updated: 18    Specimen Description . BLOOD    Special Requests 10ML RT UPPER  [address]    Culture NO GROWTH 5 HOURS    Status Pending   Culture Blood #1 [[id number]] Collected: 18 231   Order Status: Completed Specimen: Blood Updated: 09/05/18 1426    Specimen Description . BLOOD    Special Requests RH 8ML    Culture NO GROWTH 5 HOURS    Status Pending   Cult,Wound [[id number]] (Abnormal) Collected: 09/04/18 2252   Order Status: Completed Specimen: Hand Updated: 09/05/18 1150    Specimen Description . HAND LEFT    Special Requests NOT REPORTED    Direct Exam NO NEUTROPHILS SEEN    Direct Exam MANY GRAM NEGATIVE RODS (A)    Direct Exam MANY GRAM POSITIVE COCCI IN PAIRS (A)    Culture Pending    Status Pending         Thank you for allowing us to participate in the care of this patient. Please call with questions. Electronically signed by Krista Servin MD on 9/5/2018 at 5:40 PM      [facility]  Krista Servin MD  [employer]  OFFICE: [phone]  CELL:     [phone]    This note is created with the assistance of a speech recognition program.  While intending to generate a document that actually reflects the content of the visit, the document can still have some errors including those of syntax and sound a like substitutions which may escape proof reading. It such instances, actual meaning can be extrapolated by contextual diversion.

## 2018-09-06 PROBLEM — E44.0 MODERATE PROTEIN-CALORIE MALNUTRITION (HCC): Status: ACTIVE | Noted: 2018-09-06

## 2018-09-06 LAB
ABSOLUTE EOS #: 0.3 K/UL (ref 0–0.4)
ABSOLUTE IMMATURE GRANULOCYTE: ABNORMAL K/UL (ref 0–0.3)
ABSOLUTE LYMPH #: 2 K/UL (ref 1–4.8)
ABSOLUTE MONO #: 0.9 K/UL (ref 0.2–0.8)
ANION GAP SERPL CALCULATED.3IONS-SCNC: 10 MMOL/L (ref 9–17)
BASOPHILS # BLD: 1 % (ref 0–2)
BASOPHILS ABSOLUTE: 0.1 K/UL (ref 0–0.2)
BUN BLDV-MCNC: 14 MG/DL (ref 6–20)
BUN/CREAT BLD: 18 (ref 9–20)
C-REACTIVE PROTEIN: 76.9 MG/L (ref 0–5)
CALCIUM IONIZED: 1.2 MMOL/L (ref 1.13–1.33)
CALCIUM SERPL-MCNC: 8.9 MG/DL (ref 8.6–10.4)
CHLORIDE BLD-SCNC: 97 MMOL/L (ref 98–107)
CO2: 32 MMOL/L (ref 20–31)
CREAT SERPL-MCNC: 0.77 MG/DL (ref 0.7–1.2)
DIFFERENTIAL TYPE: ABNORMAL
EOSINOPHILS RELATIVE PERCENT: 2 % (ref 1–4)
ESTIMATED AVERAGE GLUCOSE: 108 MG/DL
GFR AFRICAN AMERICAN: >60 ML/MIN
GFR NON-AFRICAN AMERICAN: >60 ML/MIN
GFR SERPL CREATININE-BSD FRML MDRD: ABNORMAL ML/MIN/{1.73_M2}
GFR SERPL CREATININE-BSD FRML MDRD: ABNORMAL ML/MIN/{1.73_M2}
GLUCOSE BLD-MCNC: 88 MG/DL (ref 70–99)
HBA1C MFR BLD: 5.4 % (ref 4–6)
HCT VFR BLD CALC: 42 % (ref 41–53)
HEMOGLOBIN: 13.7 G/DL (ref 13.5–17.5)
IMMATURE GRANULOCYTES: ABNORMAL %
LYMPHOCYTES # BLD: 19 % (ref 24–44)
MAGNESIUM: 1.9 MG/DL (ref 1.6–2.6)
MCH RBC QN AUTO: 27.2 PG (ref 26–34)
MCHC RBC AUTO-ENTMCNC: 32.5 G/DL (ref 31–37)
MCV RBC AUTO: 83.7 FL (ref 80–100)
MONOCYTES # BLD: 8 % (ref 1–7)
NRBC AUTOMATED: ABNORMAL PER 100 WBC
PDW BLD-RTO: 15.1 % (ref 11.5–14.5)
PHOSPHORUS: 3.4 MG/DL (ref 2.5–4.5)
PLATELET # BLD: 296 K/UL (ref 130–400)
PLATELET ESTIMATE: ABNORMAL
PMV BLD AUTO: 7.8 FL (ref 6–12)
POTASSIUM SERPL-SCNC: 3.5 MMOL/L (ref 3.7–5.3)
PREALBUMIN: 8 MG/DL (ref 20–40)
RBC # BLD: 5.02 M/UL (ref 4.5–5.9)
RBC # BLD: ABNORMAL 10*6/UL
SEDIMENTATION RATE, ERYTHROCYTE: 28 MM (ref 0–15)
SEG NEUTROPHILS: 70 % (ref 36–66)
SEGMENTED NEUTROPHILS ABSOLUTE COUNT: 7.6 K/UL (ref 1.8–7.7)
SODIUM BLD-SCNC: 139 MMOL/L (ref 135–144)
VANCOMYCIN TROUGH DATE LAST DOSE: NORMAL
VANCOMYCIN TROUGH DOSE AMOUNT: NORMAL
VANCOMYCIN TROUGH TIME LAST DOSE: NORMAL
VANCOMYCIN TROUGH: 11.8 UG/ML (ref 10–20)
WBC # BLD: 10.9 K/UL (ref 3.5–11)
WBC # BLD: ABNORMAL 10*3/UL

## 2018-09-06 PROCEDURE — 86140 C-REACTIVE PROTEIN: CPT

## 2018-09-06 PROCEDURE — 84100 ASSAY OF PHOSPHORUS: CPT

## 2018-09-06 PROCEDURE — 99232 SBSQ HOSP IP/OBS MODERATE 35: CPT | Performed by: INTERNAL MEDICINE

## 2018-09-06 PROCEDURE — 83036 HEMOGLOBIN GLYCOSYLATED A1C: CPT

## 2018-09-06 PROCEDURE — 80048 BASIC METABOLIC PNL TOTAL CA: CPT

## 2018-09-06 PROCEDURE — 6360000002 HC RX W HCPCS: Performed by: NURSE PRACTITIONER

## 2018-09-06 PROCEDURE — 6370000000 HC RX 637 (ALT 250 FOR IP): Performed by: INTERNAL MEDICINE

## 2018-09-06 PROCEDURE — 84134 ASSAY OF PREALBUMIN: CPT

## 2018-09-06 PROCEDURE — 36415 COLL VENOUS BLD VENIPUNCTURE: CPT

## 2018-09-06 PROCEDURE — 85651 RBC SED RATE NONAUTOMATED: CPT

## 2018-09-06 PROCEDURE — 6370000000 HC RX 637 (ALT 250 FOR IP): Performed by: NURSE PRACTITIONER

## 2018-09-06 PROCEDURE — 2500000003 HC RX 250 WO HCPCS: Performed by: NURSE PRACTITIONER

## 2018-09-06 PROCEDURE — 83735 ASSAY OF MAGNESIUM: CPT

## 2018-09-06 PROCEDURE — 82330 ASSAY OF CALCIUM: CPT

## 2018-09-06 PROCEDURE — 80202 ASSAY OF VANCOMYCIN: CPT

## 2018-09-06 PROCEDURE — 85025 COMPLETE CBC W/AUTO DIFF WBC: CPT

## 2018-09-06 PROCEDURE — 2580000003 HC RX 258: Performed by: NURSE PRACTITIONER

## 2018-09-06 PROCEDURE — 1200000000 HC SEMI PRIVATE

## 2018-09-06 RX ORDER — KETOROLAC TROMETHAMINE 30 MG/ML
30 INJECTION, SOLUTION INTRAMUSCULAR; INTRAVENOUS EVERY 6 HOURS PRN
Status: DISCONTINUED | OUTPATIENT
Start: 2018-09-06 | End: 2018-09-07 | Stop reason: HOSPADM

## 2018-09-06 RX ORDER — OXYCODONE HYDROCHLORIDE AND ACETAMINOPHEN 5; 325 MG/1; MG/1
1 TABLET ORAL EVERY 4 HOURS PRN
Status: DISCONTINUED | OUTPATIENT
Start: 2018-09-06 | End: 2018-09-07 | Stop reason: HOSPADM

## 2018-09-06 RX ORDER — OXYCODONE HYDROCHLORIDE AND ACETAMINOPHEN 5; 325 MG/1; MG/1
2 TABLET ORAL EVERY 4 HOURS PRN
Status: DISCONTINUED | OUTPATIENT
Start: 2018-09-06 | End: 2018-09-07 | Stop reason: HOSPADM

## 2018-09-06 RX ADMIN — VANCOMYCIN HYDROCHLORIDE 1500 MG: 1 INJECTION, POWDER, LYOPHILIZED, FOR SOLUTION INTRAVENOUS at 01:11

## 2018-09-06 RX ADMIN — OXYCODONE HYDROCHLORIDE AND ACETAMINOPHEN 2 TABLET: 5; 325 TABLET ORAL at 15:16

## 2018-09-06 RX ADMIN — POTASSIUM CHLORIDE 40 MEQ: 20 TABLET, EXTENDED RELEASE ORAL at 11:48

## 2018-09-06 RX ADMIN — KETOROLAC TROMETHAMINE 30 MG: 30 INJECTION, SOLUTION INTRAMUSCULAR at 08:59

## 2018-09-06 RX ADMIN — VANCOMYCIN HYDROCHLORIDE 1500 MG: 1 INJECTION, POWDER, LYOPHILIZED, FOR SOLUTION INTRAVENOUS at 13:25

## 2018-09-06 RX ADMIN — TAZOBACTAM SODIUM AND PIPERACILLIN SODIUM 3.38 G: 375; 3 INJECTION, SOLUTION INTRAVENOUS at 17:56

## 2018-09-06 RX ADMIN — KETOROLAC TROMETHAMINE 30 MG: 30 INJECTION, SOLUTION INTRAMUSCULAR at 14:44

## 2018-09-06 RX ADMIN — TAZOBACTAM SODIUM AND PIPERACILLIN SODIUM 3.38 G: 375; 3 INJECTION, SOLUTION INTRAVENOUS at 07:00

## 2018-09-06 RX ADMIN — HYDROCODONE BITARTRATE AND ACETAMINOPHEN 2 TABLET: 5; 325 TABLET ORAL at 11:48

## 2018-09-06 RX ADMIN — OXYCODONE HYDROCHLORIDE AND ACETAMINOPHEN 2 TABLET: 5; 325 TABLET ORAL at 21:37

## 2018-09-06 RX ADMIN — Medication 10 ML: at 11:50

## 2018-09-06 RX ADMIN — KETOROLAC TROMETHAMINE 30 MG: 30 INJECTION, SOLUTION INTRAMUSCULAR at 01:36

## 2018-09-06 RX ADMIN — Medication 10 ML: at 01:39

## 2018-09-06 ASSESSMENT — PAIN DESCRIPTION - LOCATION
LOCATION: HAND

## 2018-09-06 ASSESSMENT — PAIN DESCRIPTION - FREQUENCY
FREQUENCY: CONTINUOUS

## 2018-09-06 ASSESSMENT — PAIN DESCRIPTION - PROGRESSION
CLINICAL_PROGRESSION: GRADUALLY WORSENING
CLINICAL_PROGRESSION: GRADUALLY WORSENING
CLINICAL_PROGRESSION: NOT CHANGED
CLINICAL_PROGRESSION: GRADUALLY IMPROVING
CLINICAL_PROGRESSION: GRADUALLY WORSENING
CLINICAL_PROGRESSION: GRADUALLY IMPROVING
CLINICAL_PROGRESSION: NOT CHANGED
CLINICAL_PROGRESSION: GRADUALLY IMPROVING
CLINICAL_PROGRESSION: GRADUALLY IMPROVING

## 2018-09-06 ASSESSMENT — PAIN SCALES - GENERAL
PAINLEVEL_OUTOF10: 5
PAINLEVEL_OUTOF10: 7
PAINLEVEL_OUTOF10: 6
PAINLEVEL_OUTOF10: 6
PAINLEVEL_OUTOF10: 3
PAINLEVEL_OUTOF10: 7
PAINLEVEL_OUTOF10: 7
PAINLEVEL_OUTOF10: 8
PAINLEVEL_OUTOF10: 7
PAINLEVEL_OUTOF10: 6
PAINLEVEL_OUTOF10: 7
PAINLEVEL_OUTOF10: 8

## 2018-09-06 ASSESSMENT — PAIN DESCRIPTION - DESCRIPTORS
DESCRIPTORS: ACHING
DESCRIPTORS: ACHING;DISCOMFORT
DESCRIPTORS: ACHING
DESCRIPTORS: ACHING

## 2018-09-06 ASSESSMENT — PAIN DESCRIPTION - PAIN TYPE
TYPE: ACUTE PAIN

## 2018-09-06 ASSESSMENT — PAIN DESCRIPTION - ORIENTATION
ORIENTATION: LEFT

## 2018-09-06 ASSESSMENT — ENCOUNTER SYMPTOMS
GASTROINTESTINAL NEGATIVE: 1
RESPIRATORY NEGATIVE: 1

## 2018-09-06 ASSESSMENT — PAIN DESCRIPTION - ONSET
ONSET: PROGRESSIVE
ONSET: PROGRESSIVE

## 2018-09-06 NOTE — CONSULTS
tenderness  LUNGS:  No increased work of breathing, good air exchange, clear to auscultation bilaterally, no crackles or wheezing  CARDIOVASCULAR:  Normal apical impulse, regular rate and rhythm, normal S1 and S2, no S3 or S4, and no murmur noted  ABDOMEN:  No scars, normal bowel sounds, soft, non-distended, non-tender, no masses palpated, no hepatosplenomegally  CHEST/BREASTS:  skin without lesion(s), no nipple retraction or dimpling, no nipple discharge, no masses palpated, no axillary or supraclavicular adenopathy  MUSCULOSKELETAL:  Left dorsal hand with 2x2cm wound with minimnal necrotis tissue at central aspect that has lifted from healthy tissue beneath. No purulent drainage. Neurovascular in tact to tips of all digits. SILT. 2-3 sec cap refill . Joints stable. Pain only at wound area. No tendons exposed. No active bleeding   NEUROLOGIC:  Awake, alert, oriented to name, place and time. Cranial nerves II-XII are grossly intact. Motor is 5 out of 5 bilaterally. Cerebellar finger to nose, heel to shin intact. Sensory is intact. Babinski down going, Romberg negative, and gait is normal.  SKIN:  normal skin color, texture, turgor    DATA:  CBC with Differential:    Lab Results   Component Value Date    WBC 10.9 09/06/2018    RBC 5.02 09/06/2018    HGB 13.7 09/06/2018    HCT 42.0 09/06/2018     09/06/2018    MCV 83.7 09/06/2018    MCH 27.2 09/06/2018    MCHC 32.5 09/06/2018    RDW 15.1 09/06/2018    LYMPHOPCT 19 09/06/2018    MONOPCT 8 09/06/2018    BASOPCT 1 09/06/2018    MONOSABS 0.90 09/06/2018    LYMPHSABS 2.00 09/06/2018    EOSABS 0.30 09/06/2018    BASOSABS 0.10 09/06/2018    DIFFTYPE NOT REPORTED 09/06/2018     Blood Culture:  No components found for: CBLOOD, CFUNGUSBL  Wound Culture:  P    IMPRESSION/RECOMMENDATIONS:    43year old male with left dorsal hand defect in setting of previous necrotizing soft tissue infection. Continue abx per ID overnight.  OK to transition to augmentin from my perspective in AM and d/c home with wound care  Daily dressing changes with wet to dry. Wrap with ACE. Elevate above heart at all times. Follow up in my office Monday AM. Call for appointment. Will plan for wound closure as indicated based on wound appearance as outpatient.  [address] [place] [address] Surgery

## 2018-09-06 NOTE — PLAN OF CARE
Problem: Pain:  Goal: Pain level will decrease  Pain level will decrease   Outcome: Ongoing  Pain not controlled with norco, toradol IV ordered and given. Problem: Infection:  Goal: Will remain free from infection  Will remain free from infection   Outcome: Ongoing  Left hand wound kept clean and dry. Dressing applied. IV antibiotics as ordered.

## 2018-09-06 NOTE — PROGRESS NOTES
PRN Meds: ketorolac, sodium chloride flush, potassium chloride **OR** potassium chloride **OR** potassium chloride, magnesium sulfate, magnesium hydroxide, bisacodyl, nicotine, acetaminophen, ondansetron **OR** ondansetron, HYDROcodone 5 mg - acetaminophen **OR** HYDROcodone 5 mg - acetaminophen    Data:     Past Medical History:   has no past medical history on file. Social History:   reports that he has been smoking Cigarettes. He has been smoking about 1.00 pack per day. He has never used smokeless tobacco. He reports that he drinks alcohol. He reports that he uses drugs, including Marijuana. Family History: History reviewed. No pertinent family history. Vitals:  BP (!) 149/94   Pulse 91   Temp 98.4 °F (36.9 °C) (Oral)   Resp 16   Ht 6' 5\" (1.956 m)   Wt 202 lb 2 oz (91.7 kg)   SpO2 98%   BMI 23.97 kg/m²   Temp (24hrs), Av.5 °F (36.9 °C), Min:98.4 °F (36.9 °C), Max:98.6 °F (37 °C)    No results for input(s): POCGLU in the last 72 hours. I/O (24Hr):     Intake/Output Summary (Last 24 hours) at 18 1319  Last data filed at 18 0526   Gross per 24 hour   Intake           2215.5 ml   Output                0 ml   Net           2215.5 ml       Labs:    Hematology:  Recent Labs      18   0558   WBC  15.9*  10.9   RBC  5.31  5.02   HGB  14.3  13.7   HCT  44.2  42.0   MCV  83.3  83.7   MCH  27.0  27.2   MCHC  32.4  32.5   RDW  15.3*  15.1*   PLT  303  296   MPV  8.1  7.8   SEDRATE   --   28*   CRP  124.2*  76.9*     Chemistry:  Recent Labs      18   0558   NA  135  139   K  3.6*  3.5*   CL  93*  97*   CO2  34*  32*   GLUCOSE  101*  88   BUN  13  14   CREATININE  0.77  0.77   MG   --   1.9   ANIONGAP  8*  10   LABGLOM  >60  >60   GFRAA  >60  >60   CALCIUM  9.5  8.9   CAION   --   1.20   PHOS   --   3.4     Recent Labs      18   0558   LABA1C  5.4         Lab Results   Component Value Date/Time    SPECIAL NOT REPORTED 2018 06:44 electrolytes--note hypokalemia today  6. Smoking cessation  7. Infectious disease following  8. Dietary consultation for prealbumen of 8 consistent with moderate protein malnutrition  9.  Start wound healing supplements and nutritional supplement    Radha Rosa DO  9/6/2018  1:19 PM

## 2018-09-06 NOTE — PROGRESS NOTES
[facility]  Progress Note    Mohan Dimas  MRN: [id number]  Date: 2018    Reason for F/U :   Necrotizing left and soft tissue infection    Impression :   · Left dorsal hand necrotizing soft tissue infection with associated inflammatory changes/cellulitis  ? Etiology is not entirely clear and could be related to an insect bite with toxins versus an infectious process    Recommendations:   · Continue empiric antimicrobial therapy with Zosyn and vancomycin. · The patient likely needs further debridement and continued wound care. · The cultures so far with normal skin Melina    Infection Control Recommendations:   Universal precautions    Discharge Planning:   Patient will need Midline Catheter Insertion/ PICC line Insertion: No  Patient will need: Home IV , [place],  SNF,  LTAC: Undetermined  Patient will need outpatient wound care:Yes    Medical Decision making / Summary of Stay:   Mohan Dimas is a 43y.o.-year-old male who was initially admitted on 2018. Souleymane Carter has no real significant past history and 3 days ago he woke up and noticed a left dorsal hand necrotic bullous area appear with no antecedent trauma. He reports progressive soft tissue swelling, redness and necrosis area over 24 hours. He does not report any prior episodes and has no history of MRSA skin and soft tissue infections. 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 He denies any witnessed spider bite. He did not have any associated fever, chills, or evidence of lymphangitis. He came into the emergency room and underwent a debridement procedure.  He has been admitted started on broad-spectrum     Current evaluation:2018    BP (!) 149/94   Pulse 91   Temp 98.4 °F (36.9 °C) (Oral)   Resp 16   Ht 6' 5\" (1.956 m)   Wt 202 lb 2 oz (91.7 kg)   SpO2 98%   BMI 23.97 kg/m²     Temperature Range: Temp: 98.4 °F (36.9 °C) Temp  Av.5 °F (36.9 °C)  Min: 98.4 °F (36.9 °C)  Max: 98.6 °F (37 °C)  The patient is seen and evaluated at bedside he does have pain in his left hand otherwise no subjective fever or chills. Review of Systems   Respiratory: Negative. Cardiovascular: Negative. Gastrointestinal: Negative. Genitourinary: Negative. Skin: Positive for rash. Neurological: Negative. Physical Examination :     Physical Exam   HENT:   Head: Normocephalic and atraumatic. Neck: Neck supple. Cardiovascular: Regular rhythm and normal heart sounds. Pulmonary/Chest: Effort normal and breath sounds normal.   Abdominal: Soft. Bowel sounds are normal.   Skin:   There is a left hand dorsal aspect wound/ulcer with some necrotic tissue superficially and some visible tendon deep           Laboratory data:   I have independently reviewed the following labs:  CBC with Differential:   Recent Labs      09/04/18 2259 09/06/18   0558   WBC  15.9*  10.9   HGB  14.3  13.7   HCT  44.2  42.0   PLT  303  296   LYMPHOPCT  13*  19*   MONOPCT  9*  8*     BMP:   Recent Labs      09/04/18 2259 09/06/18   0558   NA  135  139   K  3.6*  3.5*   CL  93*  97*   CO2  34*  32*   BUN  13  14   CREATININE  0.77  0.77   MG   --   1.9     Hepatic Function Panel: No results for input(s): PROT, LABALBU, BILIDIR, IBILI, BILITOT, ALKPHOS, ALT, AST in the last 72 hours. Recent Labs      09/05/18   0341  09/06/18   1046   VANCOTROUGH  21.4*  11.8      Lab Results   Component Value Date    CRP 76.9 (H) 09/06/2018     Lab Results   Component Value Date    SEDRATE 28 (H) 09/06/2018       Imaging Studies:   No new imaging    Cultures:     Cult,Wound [[id number]] (Abnormal) Collected: 09/04/18 2259   Order Status: Completed Specimen: Hand Updated: 09/06/18 1142    Specimen Description . HAND LEFT    Special Requests NOT REPORTED    Direct Exam NO NEUTROPHILS SEEN    Direct Exam MANY GRAM NEGATIVE RODS (A)    Direct Exam MANY GRAM POSITIVE COCCI IN PAIRS (A)    Culture NON LACTOSE FERMENTING GRAM NEGATIVE RODS MODERATE GROWTH (A)    Culture NORMAL SKIN TEOFILO (A)    Status Pending     Culture Blood #1 [[id number]] Collected: 09/04/18 2318   Order Status: Completed Specimen: Blood Updated: 09/06/18 1114    Specimen Description . BLOOD    Special Requests RH 8ML    Culture NO GROWTH 1 DAY    Status Pending   Culture Blood #1 [[id number]] (Abnormal) Collected: 09/04/18 2259   Order Status: Completed Specimen: Blood Updated: 09/06/18 0061    Specimen Description . BLOOD    Special Requests 10ML RT UPPER  [address]    Culture POSITIVE BLOOD CULTURE, RN NOTIFIED: Horacio Knight. 1006 on 9/6/18 (A)    Culture DIRECT GRAM STAIN FROM BOTTLE: GRAM POSITIVE COCCI IN CLUSTERS (A)    Culture ID by PNAFISH: STAPHYLOCOCCUS SPECIES, COAGULASE NEGATIVE (A)    Status Pending       Medications:      sodium chloride flush  10 mL Intravenous 2 times per day    enoxaparin  40 mg Subcutaneous Daily    vancomycin  1,500 mg Intravenous Q12H    piperacillin-tazobactam  3.375 g Intravenous Q8H    vancomycin (VANCOCIN) intermittent dosing (placeholder)   Other RX Placeholder     Thank you for allowing us to participate in the care of this patient. Please call with questions. [facility]  Custer Ganser MD  [employer] messaging  OFFICE: [phone]  CELL:     [phone]    Electronically signed by Custer Ganser, MD on 9/6/2018 at 2:27 PM    This note is created with the assistance of a speech recognition program.  While intending to generate a document that actually reflects the content of the visit, the document can still have some errors including those of syntax and sound a like substitutions which may escape proof reading. It such instances, actual meaning can be extrapolated by contextual diversion.

## 2018-09-07 VITALS
OXYGEN SATURATION: 95 % | SYSTOLIC BLOOD PRESSURE: 124 MMHG | TEMPERATURE: 98.8 F | HEIGHT: 77 IN | RESPIRATION RATE: 16 BRPM | WEIGHT: 199 LBS | HEART RATE: 76 BPM | DIASTOLIC BLOOD PRESSURE: 73 MMHG | BODY MASS INDEX: 23.5 KG/M2

## 2018-09-07 LAB
ANION GAP SERPL CALCULATED.3IONS-SCNC: 11 MMOL/L (ref 9–17)
BUN BLDV-MCNC: 11 MG/DL (ref 6–20)
BUN/CREAT BLD: 15 (ref 9–20)
CALCIUM SERPL-MCNC: 8.8 MG/DL (ref 8.6–10.4)
CHLORIDE BLD-SCNC: 99 MMOL/L (ref 98–107)
CO2: 29 MMOL/L (ref 20–31)
CREAT SERPL-MCNC: 0.74 MG/DL (ref 0.7–1.2)
CULTURE: ABNORMAL
DIRECT EXAM: ABNORMAL
GFR AFRICAN AMERICAN: >60 ML/MIN
GFR NON-AFRICAN AMERICAN: >60 ML/MIN
GFR SERPL CREATININE-BSD FRML MDRD: ABNORMAL ML/MIN/{1.73_M2}
GFR SERPL CREATININE-BSD FRML MDRD: ABNORMAL ML/MIN/{1.73_M2}
GLUCOSE BLD-MCNC: 105 MG/DL (ref 70–99)
Lab: ABNORMAL
Lab: ABNORMAL
MAGNESIUM: 1.7 MG/DL (ref 1.6–2.6)
ORGANISM: ABNORMAL
POTASSIUM SERPL-SCNC: 3.4 MMOL/L (ref 3.7–5.3)
SODIUM BLD-SCNC: 139 MMOL/L (ref 135–144)
SPECIMEN DESCRIPTION: ABNORMAL
SPECIMEN DESCRIPTION: ABNORMAL
STATUS: ABNORMAL
STATUS: ABNORMAL

## 2018-09-07 PROCEDURE — 6360000002 HC RX W HCPCS: Performed by: NURSE PRACTITIONER

## 2018-09-07 PROCEDURE — 2500000003 HC RX 250 WO HCPCS: Performed by: NURSE PRACTITIONER

## 2018-09-07 PROCEDURE — 36415 COLL VENOUS BLD VENIPUNCTURE: CPT

## 2018-09-07 PROCEDURE — 80048 BASIC METABOLIC PNL TOTAL CA: CPT

## 2018-09-07 PROCEDURE — 6370000000 HC RX 637 (ALT 250 FOR IP): Performed by: INTERNAL MEDICINE

## 2018-09-07 PROCEDURE — 99232 SBSQ HOSP IP/OBS MODERATE 35: CPT | Performed by: INTERNAL MEDICINE

## 2018-09-07 PROCEDURE — 2580000003 HC RX 258: Performed by: NURSE PRACTITIONER

## 2018-09-07 PROCEDURE — 83735 ASSAY OF MAGNESIUM: CPT

## 2018-09-07 PROCEDURE — 6370000000 HC RX 637 (ALT 250 FOR IP): Performed by: NURSE PRACTITIONER

## 2018-09-07 RX ORDER — OXYCODONE HYDROCHLORIDE AND ACETAMINOPHEN 5; 325 MG/1; MG/1
1-2 TABLET ORAL EVERY 4 HOURS PRN
Qty: 15 TABLET | Refills: 0 | Status: SHIPPED | OUTPATIENT
Start: 2018-09-07 | End: 2018-09-14

## 2018-09-07 RX ORDER — CIPROFLOXACIN 500 MG/1
500 TABLET, FILM COATED ORAL EVERY 12 HOURS SCHEDULED
Qty: 28 TABLET | Refills: 0 | Status: SHIPPED | OUTPATIENT
Start: 2018-09-07 | End: 2018-09-21

## 2018-09-07 RX ORDER — DOXYCYCLINE 100 MG/1
100 CAPSULE ORAL EVERY 12 HOURS SCHEDULED
Qty: 28 CAPSULE | Refills: 0 | Status: SHIPPED | OUTPATIENT
Start: 2018-09-07 | End: 2018-09-21

## 2018-09-07 RX ORDER — DOXYCYCLINE 100 MG/1
100 CAPSULE ORAL EVERY 12 HOURS SCHEDULED
Status: DISCONTINUED | OUTPATIENT
Start: 2018-09-07 | End: 2018-09-07 | Stop reason: HOSPADM

## 2018-09-07 RX ORDER — CIPROFLOXACIN 500 MG/1
500 TABLET, FILM COATED ORAL EVERY 12 HOURS SCHEDULED
Status: DISCONTINUED | OUTPATIENT
Start: 2018-09-07 | End: 2018-09-07 | Stop reason: HOSPADM

## 2018-09-07 RX ADMIN — TAZOBACTAM SODIUM AND PIPERACILLIN SODIUM 3.38 G: 375; 3 INJECTION, SOLUTION INTRAVENOUS at 03:07

## 2018-09-07 RX ADMIN — OXYCODONE HYDROCHLORIDE AND ACETAMINOPHEN 2 TABLET: 5; 325 TABLET ORAL at 08:31

## 2018-09-07 RX ADMIN — OXYCODONE HYDROCHLORIDE AND ACETAMINOPHEN 2 TABLET: 5; 325 TABLET ORAL at 03:13

## 2018-09-07 RX ADMIN — TAZOBACTAM SODIUM AND PIPERACILLIN SODIUM 3.38 G: 375; 3 INJECTION, SOLUTION INTRAVENOUS at 09:56

## 2018-09-07 RX ADMIN — KETOROLAC TROMETHAMINE 30 MG: 30 INJECTION, SOLUTION INTRAMUSCULAR at 08:28

## 2018-09-07 RX ADMIN — VANCOMYCIN HYDROCHLORIDE 1500 MG: 1 INJECTION, POWDER, LYOPHILIZED, FOR SOLUTION INTRAVENOUS at 03:07

## 2018-09-07 RX ADMIN — POTASSIUM CHLORIDE 40 MEQ: 20 TABLET, EXTENDED RELEASE ORAL at 12:05

## 2018-09-07 ASSESSMENT — PAIN SCALES - GENERAL
PAINLEVEL_OUTOF10: 7
PAINLEVEL_OUTOF10: 5

## 2018-09-07 ASSESSMENT — PAIN DESCRIPTION - FREQUENCY: FREQUENCY: CONTINUOUS

## 2018-09-07 ASSESSMENT — PAIN DESCRIPTION - PROGRESSION: CLINICAL_PROGRESSION: GRADUALLY IMPROVING

## 2018-09-07 ASSESSMENT — PAIN DESCRIPTION - ORIENTATION: ORIENTATION: LEFT

## 2018-09-07 ASSESSMENT — PAIN DESCRIPTION - DESCRIPTORS: DESCRIPTORS: ACHING

## 2018-09-07 ASSESSMENT — PAIN DESCRIPTION - PAIN TYPE: TYPE: ACUTE PAIN

## 2018-09-07 ASSESSMENT — PAIN DESCRIPTION - LOCATION: LOCATION: HAND

## 2018-09-07 ASSESSMENT — PAIN DESCRIPTION - ONSET: ONSET: PROGRESSIVE

## 2018-09-07 NOTE — PROGRESS NOTES
PO2ART, PO2, POCHCO3, AOT8DDU, HCO3, NBEA, PBEA, BEART, BE, THGBART, THB, TZP7IPC, PSHH7FVA, L1NPKDOS, O2SAT, FIO2    Radiology:    EXAMINATION: CT OF THE LEFT HAND WITH CONTRAST 9/4/2018 11:18 pm  Impression 1. 31 mm superficial abscess in the soft tissues on the dorsal aspect of the hand. Physical Examination:        General appearance:  alert, cooperative and no distress  Mental Status:  oriented to person, place and time and normal affect  Lungs:  clear to auscultation bilaterally, normal effort  Heart:  regular rate and rhythm, no murmur  Abdomen:  soft, nontender, nondistended, normal bowel sounds, no masses, hepatomegaly, splenomegaly  Extremities:  no edema, redness, tenderness in the calves  Skin:  Ace wrap in place on left hand. Some seepage through dressings again noted today. Patient is able to flex and extend fingers    Assessment:        Primary Problem  Cellulitis and abscess of hand    Active Hospital Problems    Diagnosis Date Noted    Moderate protein-calorie malnutrition ([facility] [address] [E44.0] 09/06/2018    Cellulitis [L03.90] 09/05/2018    Leukocytosis [D72.829] 09/05/2018    Hypokalemia [E87.6] 09/05/2018    Smoker [F17.200] 09/05/2018    Cellulitis and abscess of hand [L03.119, L02.519] 09/05/2018    Necrotizing soft tissue infection [M79.89]     Cellulitis of hand [T87.068]     Abscess of hand, left [L02.512]        Plan:        1. Discharge today  2.  See discharge summary    Jenny Guzmán DO  9/7/2018  1:56 PM

## 2018-09-07 NOTE — PLAN OF CARE
Problem: Pain:  Goal: Control of acute pain  Control of acute pain   Outcome: Ongoing  Pain level assessment complete.    Patient educated on pain scale and control interventions  PRN pain medication given per patient request  Patient instructed to call out with new onset of pain or unrelieved pain      Problem: Safety:  Goal: Free from accidental physical injury  Free from accidental physical injury   Outcome: Ongoing

## 2018-09-07 NOTE — CARE COORDINATION
Spoke to Dr. Hung Husbands and he wants pt to F/U with Dr. Aditi Sun, [facility] or ID first part of next week. Called Dr. Aditi Sun office and they are closed for the day. Message left with answering service and Dr. Aditi Sun will call me back. Spoke to Gini Wheeler from [facility] and pt is over income for Medicaid but is eligible for script assist.   [facility] E-faxed to [facility] OP pharmacy and spoke to Dejuan and meds will be delivered to patients room prior to discharge. Pt informed.

## 2018-09-07 NOTE — PROGRESS NOTES
(94.3 kg) (8/20/18)  · % Weight Change: 4.2%,  x 16 days  · Ideal Body Wt: 185 lb 3 oz (84 kg), % Ideal Body 109%  · BMI Classification: BMI 18.5 - 24.9 Normal Weight  · Comparative Standards (Estimated Nutrition Needs):  · Estimated Daily Total Kcal: 2,550-2,700 kcal  · Estimated Daily Protein (g): 105-115 g    Estimated Intake vs Estimated Needs: Intake Less Than Needs    Nutrition Risk Level: High    Nutrition Interventions:   Continue current diet, Continue current ONS  Continued Inpatient Monitoring, Discharge Planning    Nutrition Evaluation:   · Evaluation: Goals set   · Goals: PO intake to meet >75% of estimated kcal/protein needs    · Monitoring: Meal Intake, Supplement Intake, Diet Tolerance, Skin Integrity, Wound Healing, Ascites/Edema, Weight, Pertinent Labs, Nausea or Vomiting    See Adult Nutrition Doc Flowsheet for more detail.      Electronically signed by Jacqueline Leary RDN, LD on 9/7/18 at 2:56 PM    Contact Number: [phone]

## 2018-09-07 NOTE — PROGRESS NOTES
CLINICAL PHARMACY NOTE: MEDS TO [address] Select Patient?: No  Total # of Prescriptions Filled: 2   The following medications were delivered to the patient:  · DOXYCYCLINE HYC 100MG  · CIPROFLOXACIN 500MG  Total # of Interventions Completed: 2  Time Spent (min): 60    Additional Documentation:PT DID NOT HAVE $ FOR PERCOCET.   RN WILL HAVE HIM  IN PHARMACY TOMORROW AFTER HE CASHES HIS PAYCHECK

## 2018-09-07 NOTE — DISCHARGE SUMMARY
[facility]    Discharge Summary     Patient ID: Daquan Yates  :  1980   MRN: [id number]     ACCOUNT:  [de-identified]   Patient's PCP: No primary care provider on file. Admit Date: 2018   Discharge Date: 2018     Length of Stay: 2  Code Status:  Full Code  Admitting Physician: Alexy Springer DO  Discharge Physician: Alexy Springer DO     Active Discharge Diagnoses:     Hospital Problem Lists:  Principal Problem:    Cellulitis and abscess of hand  Active Problems:    Cellulitis    Leukocytosis    Hypokalemia    Smoker    Necrotizing soft tissue infection    Cellulitis of hand    Abscess of hand, left    Moderate protein-calorie malnutrition ([address]  Resolved Problems:    * No resolved hospital problems. *      Admission Condition:  poor     Discharged Condition: good    Hospital Stay:     Hospital Course:  Daquan Yates is a 43 y.o. male who was admitted for the management of   Cellulitis and abscess of hand , presented to ER with Insect Bite (left hand )    The patient presented to the emergency department room on 18 with infection of the left hand. Progressively worse over approximately 2 day period. He was found to have cellulitis with an abscess. No deep tissue involvement based on CT findings. There was some necrotic tissue present. Infectious disease and plastic surgery consultations were obtained. Dressings were applied. Cultures are revealing Serratia marcescens. Patient was initially treated with Zosyn and vancomycin. I spoke with Dr. Maggi Proctor in the phone today and recommended discharge on fluoroquinolone and doxycycline for 14 days. Patient will also need very close outpatient follow-up next week for ongoing wound management and possible closure of his left hand wound.     Significant therapeutic interventions: IV antibiotics, wound care, specialist consultations as above, pain control, IV fluids, electrolyte correction    Significant Diagnostic Studies:   Labs / Micro:  CBC:   Lab Results   Component Value Date    WBC 10.9 09/06/2018    RBC 5.02 09/06/2018    HGB 13.7 09/06/2018    HCT 42.0 09/06/2018    MCV 83.7 09/06/2018    MCH 27.2 09/06/2018    MCHC 32.5 09/06/2018    RDW 15.1 09/06/2018     09/06/2018     BMP:    Lab Results   Component Value Date    GLUCOSE 105 09/07/2018     09/07/2018    K 3.4 09/07/2018    CL 99 09/07/2018    CO2 29 09/07/2018    ANIONGAP 11 09/07/2018    BUN 11 09/07/2018    CREATININE 0.74 09/07/2018    BUNCRER 15 09/07/2018    CALCIUM 8.8 09/07/2018    LABGLOM >60 09/07/2018    GFRAA >60 09/07/2018    GFR      09/07/2018    GFR NOT REPORTED 09/07/2018     HFP:  No results found for: ALB, PROT  CMP:    Lab Results   Component Value Date    GLUCOSE 105 09/07/2018     09/07/2018    K 3.4 09/07/2018    CL 99 09/07/2018    CO2 29 09/07/2018    BUN 11 09/07/2018    CREATININE 0.74 09/07/2018    ANIONGAP 11 09/07/2018    LABGLOM >60 09/07/2018    GFRAA >60 09/07/2018    GFR      09/07/2018    GFR NOT REPORTED 09/07/2018    CALCIUM 8.8 09/07/2018     PT/INR:  No results found for: PTINR  PTT: No results found for: APTT  FLP:  No results found for: CHOL, TRIG, HDL  U/A:  No results found for: COLORU, TURBIDITY, SPECGRAV, HGBUR, PHUR, PROTEINU, GLUCOSEU, KETUA, BILIRUBINUR, UROBILINOGEN, NITRU, LEUKOCYTESUR  TSH:  No results found for: TSH  Culture & Susceptibility     SERRATIA MARCESCENS     Antibiotic Interpretation ASTRID Status   amikacin  NOT REPORTED Final   aztreonam Sensitive <=1 SUSCEPTIBLE Final   ceFAZolin Resistant >=64 RESISTANT Final   cefTRIAXone Sensitive <=1 SUSCEPTIBLE Final   cefepime  NOT REPORTED Final   ciprofloxacin Sensitive <=0.25 SUSCEPTIBLE Final   ertapenem  NOT REPORTED Final   gentamicin Sensitive <=1 SUSCEPTIBLE Final   meropenem  NOT REPORTED Final   nitrofurantoin  NOT REPORTED Final   tigecycline  NOT REPORTED Final   tobramycin Sensitive <=1 capsule by mouth every 12 hours for 14 days     oxyCODONE-acetaminophen 5-325 MG per tablet  Commonly known as:  PERCOCET  Take 1-2 tablets by mouth every 4 hours as needed for Pain for up to 7 days. .        Cheril Grice taking these medications    ibuprofen 800 MG tablet  Commonly known as:  ADVIL;MOTRIN  Take 1 tablet by mouth every 8 hours as needed for Pain        STOP taking these medications    naproxen 500 MG tablet  Commonly known as:  NAPROSYN           Where to Get Your Medications      These medications were sent to [address] [facility] [phone] - F [phone]  [address] [place] [address], [address] [place] [address]    Phone:  [phone]   · ciprofloxacin 500 MG tablet  · doxycycline monohydrate 100 MG capsule  · oxyCODONE-acetaminophen 5-325 MG per tablet         Time Spent on discharge is  20 mins in patient examination, evaluation, counseling as well as medication reconciliation, prescriptions for required medications, discharge plan and follow up. Electronically signed by   Jonathon Blank DO  9/7/2018  2:31 PM      Thank you Dr. Ju Sunshine primary care provider on file. for the opportunity to be involved in this patient's care.

## 2018-09-11 LAB
CULTURE: NORMAL
Lab: NORMAL
SPECIMEN DESCRIPTION: NORMAL
STATUS: NORMAL

## 2019-02-11 ENCOUNTER — OFFICE VISIT (OUTPATIENT)
Dept: FAMILY MEDICINE CLINIC | Age: 39
End: 2019-02-11

## 2019-02-11 VITALS
DIASTOLIC BLOOD PRESSURE: 80 MMHG | SYSTOLIC BLOOD PRESSURE: 120 MMHG | HEART RATE: 108 BPM | WEIGHT: 203.4 LBS | BODY MASS INDEX: 24.02 KG/M2 | OXYGEN SATURATION: 98 % | HEIGHT: 77 IN

## 2019-02-11 DIAGNOSIS — G89.29 CHRONIC MIDLINE LOW BACK PAIN WITHOUT SCIATICA: ICD-10-CM

## 2019-02-11 DIAGNOSIS — Z13.220 SCREENING FOR LIPID DISORDERS: ICD-10-CM

## 2019-02-11 DIAGNOSIS — K59.00 CONSTIPATION, UNSPECIFIED CONSTIPATION TYPE: Primary | ICD-10-CM

## 2019-02-11 DIAGNOSIS — R33.9 URINARY RETENTION: ICD-10-CM

## 2019-02-11 DIAGNOSIS — Z51.81 MEDICATION MONITORING ENCOUNTER: ICD-10-CM

## 2019-02-11 DIAGNOSIS — K63.5 COLON POLYPOSIS: ICD-10-CM

## 2019-02-11 DIAGNOSIS — M54.50 CHRONIC MIDLINE LOW BACK PAIN WITHOUT SCIATICA: ICD-10-CM

## 2019-02-11 DIAGNOSIS — R10.9 ABDOMINAL PAIN, UNSPECIFIED ABDOMINAL LOCATION: ICD-10-CM

## 2019-02-11 PROCEDURE — 99203 OFFICE O/P NEW LOW 30 MIN: CPT | Performed by: FAMILY MEDICINE

## 2019-02-11 RX ORDER — OMEPRAZOLE 40 MG/1
40 CAPSULE, DELAYED RELEASE ORAL
Qty: 28 CAPSULE | Refills: 0 | Status: SHIPPED | OUTPATIENT
Start: 2019-02-11 | End: 2019-04-09 | Stop reason: ALTCHOICE

## 2019-02-11 ASSESSMENT — PATIENT HEALTH QUESTIONNAIRE - PHQ9
7. TROUBLE CONCENTRATING ON THINGS, SUCH AS READING THE NEWSPAPER OR WATCHING TELEVISION: 3
10. IF YOU CHECKED OFF ANY PROBLEMS, HOW DIFFICULT HAVE THESE PROBLEMS MADE IT FOR YOU TO DO YOUR WORK, TAKE CARE OF THINGS AT HOME, OR GET ALONG WITH OTHER PEOPLE: 3
5. POOR APPETITE OR OVEREATING: 1
3. TROUBLE FALLING OR STAYING ASLEEP: 3
SUM OF ALL RESPONSES TO PHQ QUESTIONS 1-9: 17
1. LITTLE INTEREST OR PLEASURE IN DOING THINGS: 1
8. MOVING OR SPEAKING SO SLOWLY THAT OTHER PEOPLE COULD HAVE NOTICED. OR THE OPPOSITE, BEING SO FIGETY OR RESTLESS THAT YOU HAVE BEEN MOVING AROUND A LOT MORE THAN USUAL: 1
4. FEELING TIRED OR HAVING LITTLE ENERGY: 3
6. FEELING BAD ABOUT YOURSELF - OR THAT YOU ARE A FAILURE OR HAVE LET YOURSELF OR YOUR FAMILY DOWN: 1
SUM OF ALL RESPONSES TO PHQ9 QUESTIONS 1 & 2: 4
SUM OF ALL RESPONSES TO PHQ QUESTIONS 1-9: 17
9. THOUGHTS THAT YOU WOULD BE BETTER OFF DEAD, OR OF HURTING YOURSELF: 1
2. FEELING DOWN, DEPRESSED OR HOPELESS: 3

## 2019-02-11 ASSESSMENT — ENCOUNTER SYMPTOMS
COLOR CHANGE: 0
DIARRHEA: 0
CHEST TIGHTNESS: 0
SHORTNESS OF BREATH: 0
ABDOMINAL DISTENTION: 1
EYE REDNESS: 0
APNEA: 0
COUGH: 0
BLOOD IN STOOL: 0
EYE ITCHING: 0
EYE PAIN: 0
NAUSEA: 0
STRIDOR: 0
BACK PAIN: 0
RHINORRHEA: 0
PHOTOPHOBIA: 0
EYE DISCHARGE: 0
ABDOMINAL PAIN: 1
SORE THROAT: 0
CHOKING: 0
CONSTIPATION: 1
SINUS PRESSURE: 0
WHEEZING: 0
VOMITING: 0

## 2019-02-13 DIAGNOSIS — R10.12 LEFT UPPER QUADRANT PAIN: Primary | ICD-10-CM

## 2019-03-13 DIAGNOSIS — B18.2 CHRONIC HEPATITIS C WITHOUT HEPATIC COMA (HCC): Primary | ICD-10-CM

## 2019-03-13 DIAGNOSIS — R10.84 GENERALIZED ABDOMINAL PAIN: ICD-10-CM

## 2019-03-13 RX ORDER — OXYCODONE HYDROCHLORIDE 5 MG/1
5 CAPSULE ORAL EVERY 4 HOURS PRN
Qty: 28 CAPSULE | Refills: 0 | Status: SHIPPED | OUTPATIENT
Start: 2019-03-13 | End: 2019-03-20

## 2019-03-18 ENCOUNTER — OFFICE VISIT (OUTPATIENT)
Dept: FAMILY MEDICINE CLINIC | Age: 39
End: 2019-03-18

## 2019-03-18 VITALS
DIASTOLIC BLOOD PRESSURE: 98 MMHG | OXYGEN SATURATION: 99 % | BODY MASS INDEX: 25.78 KG/M2 | HEART RATE: 97 BPM | TEMPERATURE: 98.9 F | SYSTOLIC BLOOD PRESSURE: 174 MMHG | WEIGHT: 217.4 LBS

## 2019-03-18 DIAGNOSIS — B17.10 ACUTE HEPATITIS C VIRUS INFECTION WITHOUT HEPATIC COMA: ICD-10-CM

## 2019-03-18 DIAGNOSIS — R10.9 ABDOMINAL PAIN, UNSPECIFIED ABDOMINAL LOCATION: Primary | ICD-10-CM

## 2019-03-18 DIAGNOSIS — Z13.31 POSITIVE DEPRESSION SCREENING: ICD-10-CM

## 2019-03-18 DIAGNOSIS — R03.0 BLOOD PRESSURE ELEVATED WITHOUT HISTORY OF HTN: ICD-10-CM

## 2019-03-18 PROCEDURE — 99215 OFFICE O/P EST HI 40 MIN: CPT | Performed by: NURSE PRACTITIONER

## 2019-03-18 PROCEDURE — G8431 POS CLIN DEPRES SCRN F/U DOC: HCPCS | Performed by: NURSE PRACTITIONER

## 2019-03-18 PROCEDURE — 1111F DSCHRG MED/CURRENT MED MERGE: CPT | Performed by: NURSE PRACTITIONER

## 2019-04-05 ENCOUNTER — TELEPHONE (OUTPATIENT)
Dept: GASTROENTEROLOGY | Age: 39
End: 2019-04-05

## 2019-04-05 NOTE — TELEPHONE ENCOUNTER
Called primary number on file and a woman answered requesting us to call [phone] and no answer 3rd attempt. Sent out letter also.

## 2019-04-09 ENCOUNTER — OFFICE VISIT (OUTPATIENT)
Dept: FAMILY MEDICINE CLINIC | Age: 39
End: 2019-04-09

## 2019-04-09 VITALS
BODY MASS INDEX: 25.33 KG/M2 | WEIGHT: 213.6 LBS | DIASTOLIC BLOOD PRESSURE: 76 MMHG | OXYGEN SATURATION: 97 % | HEART RATE: 106 BPM | SYSTOLIC BLOOD PRESSURE: 120 MMHG

## 2019-04-09 DIAGNOSIS — F11.10 NARCOTIC ABUSE (HCC): ICD-10-CM

## 2019-04-09 DIAGNOSIS — K72.00 ACUTE LIVER FAILURE WITHOUT HEPATIC COMA: ICD-10-CM

## 2019-04-09 DIAGNOSIS — Z13.220 SCREENING FOR LIPID DISORDERS: ICD-10-CM

## 2019-04-09 DIAGNOSIS — F90.0 ATTENTION DEFICIT HYPERACTIVITY DISORDER (ADHD), PREDOMINANTLY INATTENTIVE TYPE: ICD-10-CM

## 2019-04-09 DIAGNOSIS — B18.2 CHRONIC HEPATITIS C WITHOUT HEPATIC COMA (HCC): Primary | ICD-10-CM

## 2019-04-09 DIAGNOSIS — D80.3 IGA WITH IGG SUBCLASS DEFICIENCY (HCC): ICD-10-CM

## 2019-04-09 PROCEDURE — 99213 OFFICE O/P EST LOW 20 MIN: CPT | Performed by: FAMILY MEDICINE

## 2019-04-09 RX ORDER — DEXTROAMPHETAMINE SACCHARATE, AMPHETAMINE ASPARTATE, DEXTROAMPHETAMINE SULFATE AND AMPHETAMINE SULFATE 5; 5; 5; 5 MG/1; MG/1; MG/1; MG/1
20 TABLET ORAL 2 TIMES DAILY
Qty: 60 TABLET | Refills: 0 | Status: SHIPPED | OUTPATIENT
Start: 2019-04-09 | End: 2019-05-10 | Stop reason: SDUPTHER

## 2019-04-09 RX ORDER — TAMSULOSIN HYDROCHLORIDE 0.4 MG/1
0.4 CAPSULE ORAL DAILY
Qty: 30 CAPSULE | Refills: 11 | Status: SHIPPED | OUTPATIENT
Start: 2019-04-09 | End: 2019-07-01 | Stop reason: SDUPTHER

## 2019-04-09 RX ORDER — VENLAFAXINE HYDROCHLORIDE 37.5 MG/1
37.5 CAPSULE, EXTENDED RELEASE ORAL DAILY
Qty: 14 CAPSULE | Refills: 0 | Status: SHIPPED | OUTPATIENT
Start: 2019-04-09 | End: 2019-05-17 | Stop reason: ALTCHOICE

## 2019-04-09 RX ORDER — BUPRENORPHINE AND NALOXONE 8; 2 MG/1; MG/1
0.33 FILM, SOLUBLE BUCCAL; SUBLINGUAL DAILY
Qty: 2 FILM | Refills: 0 | Status: SHIPPED | OUTPATIENT
Start: 2019-04-09 | End: 2019-05-17 | Stop reason: SDUPTHER

## 2019-04-09 RX ORDER — VENLAFAXINE HYDROCHLORIDE 75 MG/1
75 CAPSULE, EXTENDED RELEASE ORAL DAILY
Qty: 30 CAPSULE | Refills: 3 | Status: SHIPPED | OUTPATIENT
Start: 2019-04-09 | End: 2019-05-17

## 2019-04-09 ASSESSMENT — ENCOUNTER SYMPTOMS
RHINORRHEA: 0
VOMITING: 0
PHOTOPHOBIA: 0
SORE THROAT: 0
COUGH: 0
APNEA: 0
CONSTIPATION: 0
WHEEZING: 0
EYE REDNESS: 0
ABDOMINAL DISTENTION: 1
CHOKING: 0
EYE PAIN: 0
COLOR CHANGE: 0
EYE DISCHARGE: 0
NAUSEA: 0
SINUS PRESSURE: 0
BACK PAIN: 0
ABDOMINAL PAIN: 1
BLOOD IN STOOL: 0
DIARRHEA: 0
EYE ITCHING: 0
SHORTNESS OF BREATH: 0
STRIDOR: 0
CHEST TIGHTNESS: 0

## 2019-04-09 NOTE — PROGRESS NOTES
Subjective:      Patient ID: Beryl Hurd is a 43 y.o. male. Visit Information    Have you changed or started any medications since your last visit including any over-the-counter medicines, vitamins, or herbal medicines? no   Are you having any side effects from any of your medications? -  no  Have you stopped taking any of your medications? Is so, why? -  no    Have you seen any other physician or provider since your last visit? Yes - Records Obtained  Have you had any other diagnostic tests since your last visit? No  Have you been seen in the emergency room and/or had an admission to a hospital since we last saw you? No  Have you had your routine dental cleaning in the past 6 months? yes -     Have you activated your [employer] account? If not, what are your barriers?  Yes     Patient Care Team:  Rubina Herrera MD as PCP - General (Family Medicine)    Medical History Review  Past Medical, Family, and Social History reviewed and  contribute to the patient presenting condition    Health Maintenance   Topic Date Due    Hepatitis A vaccine (1 of 2 - Risk 2-dose series) 06/26/1980    Pneumococcal 0-64 years Vaccine (1 of 1 - PPSV23) 06/26/1985    HIV screen  06/26/1994    Hepatitis B Vaccine (1 of 3 - Risk 3-dose series) 06/26/1998    Flu vaccine (Season Ended) 09/01/2019    DTaP/Tdap/Td vaccine (2 - Td) 09/04/2028       HPI    Review of Systems    Objective:   Physical Exam    Assessment / Plan:

## 2019-04-09 NOTE — PROGRESS NOTES
Subjective:      Patient ID: Jessica Berkowitz is a 43 y.o. male. HPI   Here for follow up of narcotic addition and hepatitis C infection with evidence of ascites. He has been continuing to have a lot of abdominal pain but has been able to maintain his appetite. He has been in to see GI but has to have 6 months of clean urine testing before treatment. Review of Systems   Constitutional: Negative for activity change, appetite change, chills, diaphoresis, fatigue, fever and unexpected weight change. HENT: Negative for congestion, dental problem, ear pain, hearing loss, mouth sores, nosebleeds, postnasal drip, rhinorrhea, sinus pressure and sore throat. Eyes: Negative for photophobia, pain, discharge, redness, itching and visual disturbance. Respiratory: Negative for apnea, cough, choking, chest tightness, shortness of breath, wheezing and stridor. Cardiovascular: Negative for chest pain, palpitations and leg swelling. Gastrointestinal: Positive for abdominal distention and abdominal pain. Negative for blood in stool, constipation, diarrhea, nausea and vomiting. Genitourinary: Negative for decreased urine volume, difficulty urinating, dysuria, flank pain, frequency, scrotal swelling, testicular pain and urgency. Musculoskeletal: Negative for back pain, gait problem, joint swelling, myalgias, neck pain and neck stiffness. Skin: Negative for color change, pallor and rash. Neurological: Negative for dizziness, tremors, seizures, syncope, facial asymmetry, speech difficulty, weakness, light-headedness, numbness and headaches. Psychiatric/Behavioral: Positive for decreased concentration and sleep disturbance. Negative for agitation, behavioral problems and suicidal ideas. The patient is nervous/anxious. Objective:   Physical Exam   Constitutional: He appears well-developed and well-nourished. HENT:   Head: Normocephalic. Right Ear: Tympanic membrane is not erythematous and not bulging. Left Ear: Tympanic membrane is not erythematous and not bulging. Nose: No mucosal edema or rhinorrhea. Mouth/Throat: Uvula is midline, oropharynx is clear and moist and mucous membranes are normal.   Eyes: Pupils are equal, round, and reactive to light. Conjunctivae and EOM are normal.   Neck: Trachea normal, normal range of motion and full passive range of motion without pain. Neck supple. No JVD present. Carotid bruit is not present. Cardiovascular: Normal rate, regular rhythm, S1 normal, S2 normal, normal heart sounds and normal pulses. Exam reveals no gallop, no S3, no S4, no distant heart sounds and no friction rub. No murmur heard. No systolic murmur is present. Pulmonary/Chest: Effort normal and breath sounds normal.   Abdominal: Normal appearance and bowel sounds are normal. There is generalized tenderness. Lymphadenopathy:     He has no cervical adenopathy. Right cervical: No superficial cervical and no deep cervical adenopathy present. Left cervical: No superficial cervical and no deep cervical adenopathy present. Neurological: He is alert. He has normal strength. No cranial nerve deficit or sensory deficit. He displays a negative Romberg sign. Reflex Scores:       Brachioradialis reflexes are 2+ on the right side and 2+ on the left side. Patellar reflexes are 2+ on the right side and 2+ on the left side. Achilles reflexes are 2+ on the right side and 2+ on the left side. Skin: Skin is warm, dry and intact. He is not diaphoretic. No pallor. Psychiatric: He has a normal mood and affect. His speech is normal and behavior is normal. Judgment and thought content normal. Cognition and memory are normal.     Vitals:    04/09/19 0905   BP: 120/76   Pulse: 106   SpO2: 97%      Assessment / Plan:   1.  Chronic hepatitis C without hepatic coma (HCC)  Will need to have treatment but has to have 6 months of negative drug tests first.   - Comprehensive Metabolic Panel; Future  - Ammonia; Future  - Lactate Dehydrogenase; Future  - Protime-INR; Future    2. Acute liver failure without hepatic coma  Will have him monitor liver function monthly. - Comprehensive Metabolic Panel; Future  - Ammonia; Future  - Lactate Dehydrogenase; Future  - Protime-INR; Future    3. Screening for lipid disorders  - Lipid Panel; Future    4. IgA with IgG subclass deficiency (HCC)  - IgA; Future  - IgG; Future    5. Narcotic abuse ([facility]  OARRS report reviewed. - buprenorphine-naloxone (SUBOXONE) 8-2 MG FILM SL film; Place 0.25 Film under the tongue daily for 30 days. Dispense: 2 Film; Refill: 0    6. Attention deficit hyperactivity disorder (ADHD), predominantly inattentive type  - amphetamine-dextroamphetamine (ADDERALL, 20MG,) 20 MG tablet; Take 1 tablet by mouth 2 times daily for 30 days. Dispense: 60 tablet;  Refill: 0

## 2019-05-09 DIAGNOSIS — F41.9 ANXIETY: Primary | ICD-10-CM

## 2019-05-09 RX ORDER — LORAZEPAM 1 MG/1
.5-1 TABLET ORAL EVERY 8 HOURS PRN
Qty: 30 TABLET | Refills: 0 | Status: SHIPPED | OUTPATIENT
Start: 2019-05-09 | End: 2019-05-17 | Stop reason: SDUPTHER

## 2019-05-10 DIAGNOSIS — F90.0 ATTENTION DEFICIT HYPERACTIVITY DISORDER (ADHD), PREDOMINANTLY INATTENTIVE TYPE: ICD-10-CM

## 2019-05-10 RX ORDER — DEXTROAMPHETAMINE SACCHARATE, AMPHETAMINE ASPARTATE, DEXTROAMPHETAMINE SULFATE AND AMPHETAMINE SULFATE 5; 5; 5; 5 MG/1; MG/1; MG/1; MG/1
20 TABLET ORAL 2 TIMES DAILY
Qty: 60 TABLET | Refills: 0 | Status: SHIPPED | OUTPATIENT
Start: 2019-05-10 | End: 2019-05-31 | Stop reason: SDUPTHER

## 2019-05-10 NOTE — TELEPHONE ENCOUNTER
Last visit: 4/9/19      Next Visit Date:  Future Appointments   Date Time Provider Angelita Calles   5/17/2019  4:00 PM Edwin Santana  [address] [facility] Maintenance   Topic Date Due    Hepatitis A vaccine (1 of 2 - Risk 2-dose series) 06/26/1980    Pneumococcal 0-64 years Vaccine (1 of 3 - PCV13) 06/26/1985    HIV screen  06/26/1994    Hepatitis B Vaccine (1 of 3 - Risk 3-dose series) 06/26/1998    Flu vaccine (Season Ended) 09/01/2019    DTaP/Tdap/Td vaccine (2 - Td) 09/04/2028       Hemoglobin A1C (%)   Date Value   09/06/2018 5.4             ( goal A1C is < 7)   No results found for: LABMICR  No results found for: LDLCHOLESTEROL, LDLCALC    (goal LDL is <100)   BUN (mg/dL)   Date Value   09/07/2018 11     BP Readings from Last 3 Encounters:   04/09/19 120/76   03/18/19 (!) 174/98   02/11/19 120/80          (goal 120/80)    All Future Testing planned in [facility]  Lab Frequency Next Occurrence   XR LUMBAR SPINE (2-3 VIEWS) Once 02/11/2019   Comprehensive Metabolic Panel Once 70/74/6833   Lipase Once 02/11/2020   Lipid, Fasting Once 02/11/2020   XR ABDOMEN (KUB) (SINGLE AP VIEW) Once 02/11/2019   Hepatitis B Core Antibody, Total Once 03/13/2019   Lipid Panel Once 04/09/2019   Comprehensive Metabolic Panel Once 56/74/1298   Ammonia Once 04/09/2019   Lactate Dehydrogenase Once 04/09/2019   Protime-INR Once 04/09/2019   IgA Once 04/09/2019   IgG Once 04/09/2019               Patient Active Problem List:     Cellulitis     Leukocytosis     Hypokalemia     Smoker     Cellulitis and abscess of hand     Necrotizing soft tissue infection     Cellulitis of hand     Abscess of hand, left     Moderate protein-calorie malnutrition ([address]

## 2019-05-17 ENCOUNTER — OFFICE VISIT (OUTPATIENT)
Dept: FAMILY MEDICINE CLINIC | Age: 39
End: 2019-05-17
Payer: MEDICAID

## 2019-05-17 VITALS
HEART RATE: 85 BPM | BODY MASS INDEX: 25.87 KG/M2 | OXYGEN SATURATION: 98 % | DIASTOLIC BLOOD PRESSURE: 94 MMHG | WEIGHT: 218.2 LBS | SYSTOLIC BLOOD PRESSURE: 160 MMHG

## 2019-05-17 DIAGNOSIS — F11.10 NARCOTIC ABUSE (HCC): ICD-10-CM

## 2019-05-17 DIAGNOSIS — F41.9 ANXIETY: ICD-10-CM

## 2019-05-17 PROCEDURE — 99214 OFFICE O/P EST MOD 30 MIN: CPT | Performed by: FAMILY MEDICINE

## 2019-05-17 RX ORDER — VENLAFAXINE HYDROCHLORIDE 150 MG/1
150 CAPSULE, EXTENDED RELEASE ORAL DAILY
Qty: 30 CAPSULE | Refills: 5 | Status: SHIPPED | OUTPATIENT
Start: 2019-05-17 | End: 2019-07-01 | Stop reason: SDUPTHER

## 2019-05-17 RX ORDER — LORAZEPAM 1 MG/1
.5-1 TABLET ORAL EVERY 6 HOURS PRN
Qty: 28 TABLET | Refills: 0 | Status: SHIPPED | OUTPATIENT
Start: 2019-05-17 | End: 2019-05-24 | Stop reason: SDUPTHER

## 2019-05-17 RX ORDER — BUPRENORPHINE AND NALOXONE 8; 2 MG/1; MG/1
1 FILM, SOLUBLE BUCCAL; SUBLINGUAL DAILY
Qty: 4 FILM | Refills: 0 | Status: SHIPPED | OUTPATIENT
Start: 2019-05-17 | End: 2019-05-21 | Stop reason: SDUPTHER

## 2019-05-17 ASSESSMENT — ENCOUNTER SYMPTOMS
RHINORRHEA: 0
ABDOMINAL PAIN: 1
WHEEZING: 0
STRIDOR: 0
COLOR CHANGE: 0
ABDOMINAL DISTENTION: 1
CHOKING: 0
SINUS PRESSURE: 0
PHOTOPHOBIA: 0
APNEA: 0
EYE DISCHARGE: 0
BLOOD IN STOOL: 0
BACK PAIN: 0
VOMITING: 0
CONSTIPATION: 0
SHORTNESS OF BREATH: 0
CHEST TIGHTNESS: 0
EYE REDNESS: 0
NAUSEA: 1
COUGH: 0
SORE THROAT: 0
EYE ITCHING: 0
DIARRHEA: 0
EYE PAIN: 0

## 2019-05-17 NOTE — PROGRESS NOTES
Olena Hartman is a 43 y.o. male who presents todayfor his medical conditions/complaints as noted below. Olena Hartman is here today c/oDiscuss Medications and Hypertension      HPI:      HPI    Here for follow up of severe depression which had been worse because he was in [facility] for 10 days and they took him off his medication during that time. He has been back on effexor which he has been tolerated well so far but still has a lot of anxiety and insomnia. With his history of bipolar disorder the increased anxiety and depression are creating risk for relapse of narcotic abuse but are also creating risk for yuniel and/or suicidal depression. He currently denies any suicidal ideation but does note that he has been feeling extremely anxious. Subjective:   Review of Systems   Constitutional: Negative for activity change, appetite change, chills, diaphoresis, fatigue, fever and unexpected weight change. HENT: Negative for congestion, dental problem, ear pain, hearing loss, mouth sores, nosebleeds, postnasal drip, rhinorrhea, sinus pressure and sore throat. Eyes: Negative for photophobia, pain, discharge, redness, itching and visual disturbance. Respiratory: Negative for apnea, cough, choking, chest tightness, shortness of breath, wheezing and stridor. Cardiovascular: Negative for chest pain, palpitations and leg swelling. Gastrointestinal: Positive for abdominal distention, abdominal pain and nausea. Negative for blood in stool, constipation, diarrhea and vomiting. Genitourinary: Negative for decreased urine volume, difficulty urinating, dysuria, flank pain, frequency, scrotal swelling, testicular pain and urgency. Musculoskeletal: Negative for back pain, gait problem, joint swelling, myalgias, neck pain and neck stiffness. Skin: Negative for color change, pallor and rash.    Neurological: Negative for dizziness, tremors, seizures, syncope, facial asymmetry, speech difficulty, weakness, light-headedness, numbness and headaches. Psychiatric/Behavioral: Positive for sleep disturbance. Negative for agitation, behavioral problems, decreased concentration and suicidal ideas. The patient is nervous/anxious. Objective:    BP (!) 160/94   Pulse 85   Wt 218 lb 3.2 oz (99 kg)   SpO2 98%   BMI 25.87 kg/m²     Physical Exam   Constitutional: He appears well-developed and well-nourished. HENT:   Head: Normocephalic. Right Ear: Tympanic membrane is not erythematous and not bulging. Left Ear: Tympanic membrane is not erythematous and not bulging. Nose: No mucosal edema or rhinorrhea. Mouth/Throat: Uvula is midline, oropharynx is clear and moist and mucous membranes are normal.   Eyes: Pupils are equal, round, and reactive to light. Conjunctivae and EOM are normal.   Neck: Trachea normal, normal range of motion and full passive range of motion without pain. Neck supple. No JVD present. Carotid bruit is not present. Cardiovascular: Normal rate, regular rhythm, S1 normal, S2 normal, normal heart sounds and normal pulses. Exam reveals no gallop, no S3, no S4, no distant heart sounds and no friction rub. No murmur heard. No systolic murmur is present. Pulmonary/Chest: Effort normal and breath sounds normal.   Abdominal: He exhibits distension. Bowel sounds are decreased. There is generalized tenderness. Lymphadenopathy:     He has no cervical adenopathy. Right cervical: No superficial cervical and no deep cervical adenopathy present. Left cervical: No superficial cervical and no deep cervical adenopathy present. Neurological: He is alert. He has normal strength. No cranial nerve deficit or sensory deficit. He displays a negative Romberg sign. Reflex Scores:       Brachioradialis reflexes are 2+ on the right side and 2+ on the left side. Patellar reflexes are 2+ on the right side and 2+ on the left side.        Achilles reflexes are 2+ on the right side and 2+ on the left side. Skin: Skin is warm, dry and intact. He is not diaphoretic. No pallor. Psychiatric: His speech is normal and behavior is normal. Judgment and thought content normal. His mood appears not anxious. His affect is not angry, not blunt, not labile and not inappropriate. Cognition and memory are normal. He exhibits a depressed mood. Assessment & Plan:   1. Narcotic abuse ([facility] [address]  Refilled suboxone, set up appointment to begin counseling and referred to a 12 step program.  He will also continue to work with [facility] for counseling intervention as court mandated in addition. He will continue with weekly appointments. - buprenorphine-naloxone (SUBOXONE) 8-2 MG FILM SL film; Place 1 Film under the tongue daily for 30 days. Dispense: 4 Film; Refill: 0    2. Anxiety  - LORazepam (ATIVAN) 1 MG tablet; Take 0.5-1 tablets by mouth every 6 hours as needed for Anxiety for up to 7 days. Dispense: 28 tablet;  Refill: 0

## 2019-05-17 NOTE — PROGRESS NOTES
Subjective:      Patient ID: Cheng Muse is a 43 y.o. male. Visit Information    Have you changed or started any medications since your last visit including any over-the-counter medicines, vitamins, or herbal medicines? no   Are you having any side effects from any of your medications? -  no  Have you stopped taking any of your medications? Is so, why? -  no    Have you seen any other physician or provider since your last visit? No  Have you had any other diagnostic tests since your last visit? No  Have you been seen in the emergency room and/or had an admission to a hospital since we last saw you? No  Have you had your routine dental cleaning in the past 6 months? yes -     Have you activated your [employer] account? If not, what are your barriers?  Yes     Patient Care Team:  Garth Mccray MD as PCP - General (Family Medicine)    Medical History Review  Past Medical, Family, and Social History reviewed and  contribute to the patient presenting condition    Health Maintenance   Topic Date Due    Hepatitis A vaccine (1 of 2 - Risk 2-dose series) 1980    Pneumococcal 0-64 years Vaccine (1 of 3 - PCV13) 1985    HIV screen  1994    Hepatitis B Vaccine (1 of 3 - Risk 3-dose series) 1998    Flu vaccine (Season Ended) 2019    DTaP/Tdap/Td vaccine (2 - Td) 2028       HPI    Review of Systems    Objective:   Physical Exam    Assessment / Plan:

## 2019-05-21 DIAGNOSIS — F11.10 NARCOTIC ABUSE (HCC): ICD-10-CM

## 2019-05-22 RX ORDER — BUPRENORPHINE AND NALOXONE 8; 2 MG/1; MG/1
1 FILM, SOLUBLE BUCCAL; SUBLINGUAL DAILY
Qty: 4 FILM | Refills: 0 | Status: SHIPPED | OUTPATIENT
Start: 2019-05-22 | End: 2019-05-24 | Stop reason: SDUPTHER

## 2019-05-22 NOTE — TELEPHONE ENCOUNTER
Last visit: 5/17/19  Oars please review today       Next Visit Date:  Future Appointments   Date Time Provider Angelita Maoi   5/24/2019  3:00 PM Alesha Marinelli  [address] [facility]   Topic Date Due    Hepatitis A vaccine (1 of 2 - Risk 2-dose series) 06/26/1980    Pneumococcal 0-64 years Vaccine (1 of 3 - PCV13) 06/26/1985    HIV screen  06/26/1994    Hepatitis B Vaccine (1 of 3 - Risk 3-dose series) 06/26/1998    Flu vaccine (Season Ended) 09/01/2019    DTaP/Tdap/Td vaccine (2 - Td) 09/04/2028       Hemoglobin A1C (%)   Date Value   09/06/2018 5.4             ( goal A1C is < 7)   No results found for: LABMICR  No results found for: LDLCHOLESTEROL, LDLCALC    (goal LDL is <100)   BUN (mg/dL)   Date Value   09/07/2018 11     BP Readings from Last 3 Encounters:   05/17/19 (!) 160/94   04/09/19 120/76   03/18/19 (!) 174/98          (goal 120/80)    All Future Testing planned in [facility]  Lab Frequency Next Occurrence   XR LUMBAR SPINE (2-3 VIEWS) Once 02/11/2019   Comprehensive Metabolic Panel Once 76/72/4831   Lipase Once 02/11/2020   Lipid, Fasting Once 02/11/2020   XR ABDOMEN (KUB) (SINGLE AP VIEW) Once 02/11/2019   Hepatitis B Core Antibody, Total Once 03/13/2019   Lipid Panel Once 04/09/2019   Comprehensive Metabolic Panel Once 65/70/8094   Ammonia Once 04/09/2019   Lactate Dehydrogenase Once 04/09/2019   Protime-INR Once 04/09/2019   IgA Once 04/09/2019   IgG Once 04/09/2019               Patient Active Problem List:     Cellulitis     Leukocytosis     Hypokalemia     Smoker     Cellulitis and abscess of hand     Necrotizing soft tissue infection     Cellulitis of hand     Abscess of hand, left     Moderate protein-calorie malnutrition ([address]

## 2019-05-24 ENCOUNTER — OFFICE VISIT (OUTPATIENT)
Dept: FAMILY MEDICINE CLINIC | Age: 39
End: 2019-05-24
Payer: MEDICAID

## 2019-05-24 VITALS — SYSTOLIC BLOOD PRESSURE: 140 MMHG | DIASTOLIC BLOOD PRESSURE: 90 MMHG | HEART RATE: 100 BPM

## 2019-05-24 DIAGNOSIS — F11.10 NARCOTIC ABUSE (HCC): ICD-10-CM

## 2019-05-24 DIAGNOSIS — F41.9 ANXIETY: ICD-10-CM

## 2019-05-24 PROCEDURE — 99213 OFFICE O/P EST LOW 20 MIN: CPT | Performed by: FAMILY MEDICINE

## 2019-05-24 RX ORDER — BUPRENORPHINE AND NALOXONE 8; 2 MG/1; MG/1
1 FILM, SOLUBLE BUCCAL; SUBLINGUAL DAILY
Qty: 7 FILM | Refills: 0 | Status: SHIPPED | OUTPATIENT
Start: 2019-05-25 | End: 2019-05-31 | Stop reason: SDUPTHER

## 2019-05-24 RX ORDER — LORAZEPAM 1 MG/1
.5-1 TABLET ORAL EVERY 6 HOURS PRN
Qty: 28 TABLET | Refills: 2 | Status: SHIPPED | OUTPATIENT
Start: 2019-05-24 | End: 2019-05-31 | Stop reason: SDUPTHER

## 2019-05-24 RX ORDER — BUPRENORPHINE AND NALOXONE 8; 2 MG/1; MG/1
1 FILM, SOLUBLE BUCCAL; SUBLINGUAL DAILY
Qty: 4 FILM | Refills: 0 | Status: SHIPPED | OUTPATIENT
Start: 2019-05-25 | End: 2019-05-24 | Stop reason: SDUPTHER

## 2019-05-24 ASSESSMENT — ENCOUNTER SYMPTOMS
NAUSEA: 0
CHOKING: 0
RHINORRHEA: 0
STRIDOR: 0
EYE PAIN: 0
ABDOMINAL DISTENTION: 0
DIARRHEA: 0
CHEST TIGHTNESS: 0
SHORTNESS OF BREATH: 0
BLOOD IN STOOL: 0
COLOR CHANGE: 0
EYE REDNESS: 0
APNEA: 0
EYE DISCHARGE: 0
CONSTIPATION: 0
BACK PAIN: 0
COUGH: 0
ABDOMINAL PAIN: 0
WHEEZING: 0
SINUS PRESSURE: 0
VOMITING: 0
EYE ITCHING: 0
PHOTOPHOBIA: 0
SORE THROAT: 0

## 2019-05-24 NOTE — PROGRESS NOTES
Yoanna Toribio is a 43 y.o. male who presents todayfor his medical conditions/complaints as noted below. Yoanna Toribio is here today c/oCheck-Up (X1 WEEK ) and Hypertension      HPI:      HPI    Here for follow up of depression/anxiety which has in the past lead to self medication with heroin and fentanyl. He has started on suboxone therapy for narcotic treatment and so far has been doing well with it. He denies any side effects from the suboxone and denies any relapses with drug use. He does have some increased depression and has feelings of uselessness now that he is out of work in order to qualify for medicaid so that he can get treatment for hep C. I would like him to start in counseling her in the office and would like him to continue in a twelve step program.  He has been having a lot of apathy over the past few weeks but has been able to maintain things that he would like to look forward to. Subjective:   Review of Systems   Constitutional: Negative for activity change, appetite change, chills, diaphoresis, fatigue, fever and unexpected weight change. HENT: Negative for congestion, dental problem, ear pain, hearing loss, mouth sores, nosebleeds, postnasal drip, rhinorrhea, sinus pressure and sore throat. Eyes: Negative for photophobia, pain, discharge, redness, itching and visual disturbance. Respiratory: Negative for apnea, cough, choking, chest tightness, shortness of breath, wheezing and stridor. Cardiovascular: Negative for chest pain, palpitations and leg swelling. Gastrointestinal: Negative for abdominal distention, abdominal pain, blood in stool, constipation, diarrhea, nausea and vomiting. Genitourinary: Negative for decreased urine volume, difficulty urinating, dysuria, flank pain, frequency, scrotal swelling, testicular pain and urgency. Musculoskeletal: Negative for back pain, gait problem, joint swelling, myalgias, neck pain and neck stiffness.    Skin: Negative for color change, pallor and rash. Neurological: Negative for dizziness, tremors, seizures, syncope, facial asymmetry, speech difficulty, weakness, light-headedness, numbness and headaches. Psychiatric/Behavioral: Negative for agitation, behavioral problems, decreased concentration, sleep disturbance and suicidal ideas. The patient is not nervous/anxious. Objective:    BP (!) 140/90   Pulse 100     Physical Exam   Constitutional: He appears well-developed and well-nourished. HENT:   Head: Normocephalic. Right Ear: Tympanic membrane is not erythematous and not bulging. Left Ear: Tympanic membrane is not erythematous and not bulging. Nose: No mucosal edema or rhinorrhea. Mouth/Throat: Uvula is midline, oropharynx is clear and moist and mucous membranes are normal.   Eyes: Pupils are equal, round, and reactive to light. Conjunctivae and EOM are normal.   Neck: Trachea normal, normal range of motion and full passive range of motion without pain. Neck supple. No JVD present. Carotid bruit is not present. Cardiovascular: Normal rate, regular rhythm, S1 normal, S2 normal, normal heart sounds and normal pulses. Exam reveals no gallop, no S3, no S4, no distant heart sounds and no friction rub. No murmur heard. No systolic murmur is present. Pulmonary/Chest: Effort normal and breath sounds normal.   Abdominal: Normal appearance and bowel sounds are normal. There is no tenderness. Lymphadenopathy:     He has no cervical adenopathy. Right cervical: No superficial cervical and no deep cervical adenopathy present. Left cervical: No superficial cervical and no deep cervical adenopathy present. Neurological: He is alert. He has normal strength. No cranial nerve deficit or sensory deficit. He displays a negative Romberg sign. Reflex Scores:       Brachioradialis reflexes are 2+ on the right side and 2+ on the left side.        Patellar reflexes are 2+ on the right side and 2+ on the left side. Achilles reflexes are 2+ on the right side and 2+ on the left side. Skin: Skin is warm, dry and intact. He is not diaphoretic. No pallor. Psychiatric: He has a normal mood and affect. His speech is normal and behavior is normal. Judgment and thought content normal. Cognition and memory are normal.       Assessment & Plan:     1. Narcotic abuse ([facility] [address]  There is some increasing depression so I would like him to get in an do some counseling with Dr. Anisa Bell . - buprenorphine-naloxone (SUBOXONE) 8-2 MG FILM SL film; Place 1 Film under the tongue daily for 30 days.   Dispense: 4 Film; Refill: 0

## 2019-05-24 NOTE — PROGRESS NOTES
Subjective:      Patient ID: Cheng Muse is a 43 y.o. male. Visit Information    Have you changed or started any medications since your last visit including any over-the-counter medicines, vitamins, or herbal medicines? no   Are you having any side effects from any of your medications? -  no  Have you stopped taking any of your medications? Is so, why? -  no    Have you seen any other physician or provider since your last visit? No  Have you had any other diagnostic tests since your last visit? No  Have you been seen in the emergency room and/or had an admission to a hospital since we last saw you? No  Have you had your routine dental cleaning in the past 6 months? yes -     Have you activated your [employer] account? If not, what are your barriers?  Yes     Patient Care Team:  Garth Mccray MD as PCP - General (Family Medicine)    Medical History Review  Past Medical, Family, and Social History reviewed and  contribute to the patient presenting condition    Health Maintenance   Topic Date Due    Hepatitis A vaccine (1 of 2 - Risk 2-dose series) 1980    Pneumococcal 0-64 years Vaccine (1 of 3 - PCV13) 1985    HIV screen  1994    Hepatitis B Vaccine (1 of 3 - Risk 3-dose series) 1998    Flu vaccine (Season Ended) 2019    DTaP/Tdap/Td vaccine (2 - Td) 2028       HPI    Review of Systems    Objective:   Physical Exam    Assessment / Plan:

## 2019-05-31 ENCOUNTER — OFFICE VISIT (OUTPATIENT)
Dept: FAMILY MEDICINE CLINIC | Age: 39
End: 2019-05-31
Payer: MEDICAID

## 2019-05-31 ENCOUNTER — OFFICE VISIT (OUTPATIENT)
Dept: BEHAVIORAL/MENTAL HEALTH | Age: 39
End: 2019-05-31
Payer: MEDICAID

## 2019-05-31 VITALS
SYSTOLIC BLOOD PRESSURE: 118 MMHG | BODY MASS INDEX: 26.35 KG/M2 | OXYGEN SATURATION: 98 % | WEIGHT: 222.2 LBS | HEART RATE: 64 BPM | DIASTOLIC BLOOD PRESSURE: 70 MMHG

## 2019-05-31 DIAGNOSIS — F11.10 NARCOTIC ABUSE (HCC): ICD-10-CM

## 2019-05-31 DIAGNOSIS — F90.0 ATTENTION DEFICIT HYPERACTIVITY DISORDER (ADHD), PREDOMINANTLY INATTENTIVE TYPE: ICD-10-CM

## 2019-05-31 DIAGNOSIS — F11.11 OPIOID USE DISORDER, MILD, IN EARLY REMISSION, ON MAINTENANCE THERAPY (HCC): ICD-10-CM

## 2019-05-31 DIAGNOSIS — F41.9 ANXIETY: ICD-10-CM

## 2019-05-31 DIAGNOSIS — F41.1 GAD (GENERALIZED ANXIETY DISORDER): Primary | ICD-10-CM

## 2019-05-31 DIAGNOSIS — F31.60 BIPOLAR AFFECTIVE DISORDER, CURRENT EPISODE MIXED, CURRENT EPISODE SEVERITY UNSPECIFIED (HCC): ICD-10-CM

## 2019-05-31 PROCEDURE — 90791 PSYCH DIAGNOSTIC EVALUATION: CPT | Performed by: PSYCHOLOGIST

## 2019-05-31 PROCEDURE — 99213 OFFICE O/P EST LOW 20 MIN: CPT | Performed by: FAMILY MEDICINE

## 2019-05-31 RX ORDER — DEXTROAMPHETAMINE SACCHARATE, AMPHETAMINE ASPARTATE, DEXTROAMPHETAMINE SULFATE AND AMPHETAMINE SULFATE 5; 5; 5; 5 MG/1; MG/1; MG/1; MG/1
20 TABLET ORAL 2 TIMES DAILY
Qty: 60 TABLET | Refills: 0 | Status: SHIPPED | OUTPATIENT
Start: 2019-05-31 | End: 2019-05-31

## 2019-05-31 RX ORDER — LORAZEPAM 1 MG/1
.5-1 TABLET ORAL EVERY 6 HOURS PRN
Qty: 90 TABLET | Refills: 0 | Status: SHIPPED | OUTPATIENT
Start: 2019-05-31 | End: 2019-07-09 | Stop reason: SDUPTHER

## 2019-05-31 RX ORDER — DEXTROAMPHETAMINE SACCHARATE, AMPHETAMINE ASPARTATE, DEXTROAMPHETAMINE SULFATE AND AMPHETAMINE SULFATE 5; 5; 5; 5 MG/1; MG/1; MG/1; MG/1
20 TABLET ORAL 3 TIMES DAILY
Qty: 90 TABLET | Refills: 0 | Status: SHIPPED | OUTPATIENT
Start: 2019-05-31 | End: 2019-07-01 | Stop reason: SDUPTHER

## 2019-05-31 RX ORDER — BUPRENORPHINE AND NALOXONE 8; 2 MG/1; MG/1
1 FILM, SOLUBLE BUCCAL; SUBLINGUAL DAILY
Qty: 7 FILM | Refills: 0 | Status: SHIPPED | OUTPATIENT
Start: 2019-05-31 | End: 2019-06-30

## 2019-05-31 ASSESSMENT — ENCOUNTER SYMPTOMS
ABDOMINAL PAIN: 1
NAUSEA: 1
VOMITING: 0
ABDOMINAL DISTENTION: 1
RESPIRATORY NEGATIVE: 1
BLOOD IN STOOL: 0
CONSTIPATION: 0
DIARRHEA: 0
RECTAL PAIN: 0
ANAL BLEEDING: 0

## 2019-05-31 NOTE — PROGRESS NOTES
Yoanna Toribio is a 43 y.o. male who presents todayfor his medical conditions/complaints as noted below. Yoanna Toribio is here today c/oCheck-Up (x1 week )      HPI:      HPI    Here for follow up of depression and feelings of poor self worth. He has been making a list daily of things that he wants to get done and this seems to be helping with his motivation. He has been doing court mandated urine toxicology screens once weekly which have been clean, and this will continue for the next 6 weeks. He has been continuing to feel very down and depressed but has been having less in the way of self defeatist thinking. He has had lows where he felt life wasn't worth living but has not had active suicidal thinking or making any specific plans. He has been attending [place] [address] meetings as well as  meetings and has been finding this helpful. Subjective:   Review of Systems   Respiratory: Negative. Cardiovascular: Negative. Gastrointestinal: Positive for abdominal distention, abdominal pain and nausea. Negative for anal bleeding, blood in stool, constipation, diarrhea, rectal pain and vomiting. Psychiatric/Behavioral: Positive for decreased concentration, dysphoric mood and sleep disturbance. Negative for agitation, behavioral problems, confusion, hallucinations, self-injury and suicidal ideas. The patient is nervous/anxious and is hyperactive. Objective:    /70   Pulse 64   Wt 222 lb 3.2 oz (100.8 kg)   SpO2 98%   BMI 26.35 kg/m²     Physical Exam   Cardiovascular: Normal rate, regular rhythm, S1 normal, S2 normal and normal heart sounds. No murmur heard. Pulmonary/Chest: Effort normal and breath sounds normal.   Abdominal: Soft. Normal appearance. He exhibits distension. He exhibits no shifting dullness, no pulsatile liver, no fluid wave, no abdominal bruit, no ascites, no pulsatile midline mass and no mass. Bowel sounds are decreased.  There is no hepatosplenomegaly, splenomegaly or
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
## 2019-05-31 NOTE — PROGRESS NOTES
Subjective:      Patient ID: Himanshu Whalen is a 43 y.o. male. Visit Information    Have you changed or started any medications since your last visit including any over-the-counter medicines, vitamins, or herbal medicines? no   Are you having any side effects from any of your medications? -  no  Have you stopped taking any of your medications? Is so, why? -  no    Have you seen any other physician or provider since your last visit? Yes - Records Obtained  Have you had any other diagnostic tests since your last visit? No  Have you been seen in the emergency room and/or had an admission to a hospital since we last saw you? No  Have you had your routine dental cleaning in the past 6 months? yes -     Have you activated your [employer] account? If not, what are your barriers?  Yes     Patient Care Team:  Ingrid Robertson MD as PCP - General (Family Medicine)    Medical History Review  Past Medical, Family, and Social History reviewed and  contribute to the patient presenting condition    Health Maintenance   Topic Date Due    Hepatitis A vaccine (1 of 2 - Risk 2-dose series) 06/26/1980    Pneumococcal 0-64 years Vaccine (1 of 3 - PCV13) 06/26/1985    HIV screen  06/26/1994    Hepatitis B Vaccine (1 of 3 - Risk 3-dose series) 06/26/1998    Flu vaccine (Season Ended) 09/01/2019    DTaP/Tdap/Td vaccine (2 - Td) 09/04/2028       HPI    Review of Systems    Objective:   Physical Exam    Assessment / Plan:

## 2019-07-01 ENCOUNTER — TELEPHONE (OUTPATIENT)
Dept: FAMILY MEDICINE CLINIC | Age: 39
End: 2019-07-01

## 2019-07-01 DIAGNOSIS — F90.0 ATTENTION DEFICIT HYPERACTIVITY DISORDER (ADHD), PREDOMINANTLY INATTENTIVE TYPE: ICD-10-CM

## 2019-07-01 RX ORDER — TAMSULOSIN HYDROCHLORIDE 0.4 MG/1
0.4 CAPSULE ORAL DAILY
Qty: 30 CAPSULE | Refills: 11 | Status: SHIPPED | OUTPATIENT
Start: 2019-07-01 | End: 2019-07-31 | Stop reason: SDUPTHER

## 2019-07-01 RX ORDER — VENLAFAXINE HYDROCHLORIDE 150 MG/1
150 CAPSULE, EXTENDED RELEASE ORAL DAILY
Qty: 30 CAPSULE | Refills: 5 | Status: ON HOLD | OUTPATIENT
Start: 2019-07-01 | End: 2020-07-06 | Stop reason: HOSPADM

## 2019-07-01 RX ORDER — DEXTROAMPHETAMINE SACCHARATE, AMPHETAMINE ASPARTATE, DEXTROAMPHETAMINE SULFATE AND AMPHETAMINE SULFATE 5; 5; 5; 5 MG/1; MG/1; MG/1; MG/1
20 TABLET ORAL 3 TIMES DAILY
Qty: 90 TABLET | Refills: 0 | Status: SHIPPED | OUTPATIENT
Start: 2019-07-01 | End: 2019-08-01 | Stop reason: SDUPTHER

## 2019-07-08 DIAGNOSIS — F41.9 ANXIETY: ICD-10-CM

## 2019-07-09 RX ORDER — LORAZEPAM 1 MG/1
.5-1 TABLET ORAL EVERY 6 HOURS PRN
Qty: 90 TABLET | Refills: 0 | Status: SHIPPED | OUTPATIENT
Start: 2019-07-09 | End: 2019-08-08

## 2019-07-31 ENCOUNTER — OFFICE VISIT (OUTPATIENT)
Dept: FAMILY MEDICINE CLINIC | Age: 39
End: 2019-07-31
Payer: MEDICAID

## 2019-07-31 ENCOUNTER — TELEPHONE (OUTPATIENT)
Dept: FAMILY MEDICINE CLINIC | Age: 39
End: 2019-07-31

## 2019-07-31 VITALS
BODY MASS INDEX: 26.47 KG/M2 | OXYGEN SATURATION: 98 % | WEIGHT: 223.2 LBS | SYSTOLIC BLOOD PRESSURE: 136 MMHG | DIASTOLIC BLOOD PRESSURE: 84 MMHG | HEART RATE: 102 BPM

## 2019-07-31 DIAGNOSIS — F41.9 ANXIETY: Primary | ICD-10-CM

## 2019-07-31 DIAGNOSIS — F90.0 ATTENTION DEFICIT HYPERACTIVITY DISORDER (ADHD), PREDOMINANTLY INATTENTIVE TYPE: ICD-10-CM

## 2019-07-31 PROCEDURE — 99213 OFFICE O/P EST LOW 20 MIN: CPT | Performed by: FAMILY MEDICINE

## 2019-07-31 PROCEDURE — G8419 CALC BMI OUT NRM PARAM NOF/U: HCPCS | Performed by: FAMILY MEDICINE

## 2019-07-31 PROCEDURE — 4004F PT TOBACCO SCREEN RCVD TLK: CPT | Performed by: FAMILY MEDICINE

## 2019-07-31 PROCEDURE — G8427 DOCREV CUR MEDS BY ELIG CLIN: HCPCS | Performed by: FAMILY MEDICINE

## 2019-07-31 RX ORDER — TAMSULOSIN HYDROCHLORIDE 0.4 MG/1
0.8 CAPSULE ORAL DAILY
Qty: 60 CAPSULE | Refills: 0 | Status: ON HOLD | OUTPATIENT
Start: 2019-07-31 | End: 2020-12-16 | Stop reason: SDUPTHER

## 2019-07-31 RX ORDER — LORAZEPAM 0.5 MG/1
TABLET ORAL
Qty: 90 TABLET | Refills: 0 | Status: SHIPPED | OUTPATIENT
Start: 2019-07-31 | End: 2019-08-31

## 2019-07-31 RX ORDER — VENLAFAXINE HYDROCHLORIDE 75 MG/1
225 CAPSULE, EXTENDED RELEASE ORAL DAILY
Qty: 270 CAPSULE | Refills: 1 | Status: SHIPPED | OUTPATIENT
Start: 2019-07-31

## 2019-07-31 ASSESSMENT — ENCOUNTER SYMPTOMS
APNEA: 0
COLOR CHANGE: 0
COUGH: 0
EYE PAIN: 0
ABDOMINAL DISTENTION: 1
SINUS PRESSURE: 0
CHOKING: 0
PHOTOPHOBIA: 0
CHEST TIGHTNESS: 0
SHORTNESS OF BREATH: 0
EYE REDNESS: 0
RHINORRHEA: 0
CONSTIPATION: 0
EYE DISCHARGE: 0
SORE THROAT: 0
STRIDOR: 0
EYE ITCHING: 0
WHEEZING: 0
DIARRHEA: 0
BACK PAIN: 0
VOMITING: 0
NAUSEA: 0
BLOOD IN STOOL: 0
ABDOMINAL PAIN: 1

## 2019-07-31 NOTE — TELEPHONE ENCOUNTER
That should go through with a PA if he's willing to continue the adderall for now until that is processed which may take up to 10 days. Let me know if that's ok and I'll send in an Rx for the adderall for 10 days.

## 2019-07-31 NOTE — PROGRESS NOTES
ideas. The patient is nervous/anxious. Objective:    /84 (Site: Left Upper Arm, Position: Sitting, Cuff Size: Medium Adult)   Pulse 102   Wt 223 lb 3.2 oz (101.2 kg)   SpO2 98%   BMI 26.47 kg/m²     Physical Exam   Constitutional: He appears well-developed and well-nourished. HENT:   Head: Normocephalic. Right Ear: Tympanic membrane is not erythematous and not bulging. Left Ear: Tympanic membrane is not erythematous and not bulging. Nose: No mucosal edema or rhinorrhea. Mouth/Throat: Uvula is midline, oropharynx is clear and moist and mucous membranes are normal.   Eyes: Pupils are equal, round, and reactive to light. Conjunctivae and EOM are normal.   Neck: Trachea normal, normal range of motion and full passive range of motion without pain. Neck supple. No JVD present. Carotid bruit is not present. Cardiovascular: Normal rate, regular rhythm, S1 normal, S2 normal, normal heart sounds and normal pulses. Exam reveals no gallop, no S3, no S4, no distant heart sounds and no friction rub. No murmur heard. No systolic murmur is present. Pulmonary/Chest: Effort normal and breath sounds normal.   Abdominal: Normal appearance and bowel sounds are normal. There is no tenderness. Lymphadenopathy:     He has no cervical adenopathy. Right cervical: No superficial cervical and no deep cervical adenopathy present. Left cervical: No superficial cervical and no deep cervical adenopathy present. Neurological: He is alert. He has normal strength. No cranial nerve deficit or sensory deficit. He displays a negative Romberg sign. Reflex Scores:       Brachioradialis reflexes are 2+ on the right side and 2+ on the left side. Patellar reflexes are 2+ on the right side and 2+ on the left side. Achilles reflexes are 2+ on the right side and 2+ on the left side. Skin: Skin is warm, dry and intact. He is not diaphoretic. No pallor.    Psychiatric: He has a normal mood and affect. His speech is normal and behavior is normal. Judgment and thought content normal. Cognition and memory are normal.       Assessment & Plan:    1. Attention deficit hyperactivity disorder (ADHD), predominantly inattentive type  Changed his medication from adderall to vyvanse which he did well with in the past but had to change because of the insurance covereage.   - lisdexamfetamine (VYVANSE) 70 MG capsule; Take 1 capsule by mouth every morning for 30 days. Dispense: 30 capsule; Refill: 0    2. Anxiety  Will plan to wean him off the ativan over the next month. - LORazepam (ATIVAN) 0.5 MG tablet; 1/2-1 PO TID PRN for anxiety. Dispense: 90 tablet; Refill: 0      The dizziness that he has been having sounds like a combination of intravascular deletion and eustachian dysfunction.

## 2019-08-01 DIAGNOSIS — F90.0 ATTENTION DEFICIT HYPERACTIVITY DISORDER (ADHD), PREDOMINANTLY INATTENTIVE TYPE: ICD-10-CM

## 2019-08-01 RX ORDER — DEXTROAMPHETAMINE SACCHARATE, AMPHETAMINE ASPARTATE, DEXTROAMPHETAMINE SULFATE AND AMPHETAMINE SULFATE 5; 5; 5; 5 MG/1; MG/1; MG/1; MG/1
20 TABLET ORAL 3 TIMES DAILY
Qty: 90 TABLET | Refills: 0 | OUTPATIENT
Start: 2019-08-01 | End: 2019-08-31

## 2019-08-01 RX ORDER — DEXTROAMPHETAMINE SACCHARATE, AMPHETAMINE ASPARTATE, DEXTROAMPHETAMINE SULFATE AND AMPHETAMINE SULFATE 5; 5; 5; 5 MG/1; MG/1; MG/1; MG/1
20 TABLET ORAL 3 TIMES DAILY
Qty: 90 TABLET | Refills: 0 | Status: SHIPPED | OUTPATIENT
Start: 2019-08-01 | End: 2019-08-31

## 2019-08-02 ENCOUNTER — TELEPHONE (OUTPATIENT)
Dept: FAMILY MEDICINE CLINIC | Age: 39
End: 2019-08-02

## 2019-08-02 NOTE — TELEPHONE ENCOUNTER
Patient called stating that he is now needing PA for his adderall, what should you suggest him to do?

## 2019-08-02 NOTE — TELEPHONE ENCOUNTER
As long as it's going to need a PA either way, then the PA should probably be done for the Vyvanse rather than the adderall. If he has a formulary there may be alternatives that we could change to but I wouldn't know what they'd be without seeing his formulary.

## 2019-09-16 DIAGNOSIS — F90.0 ATTENTION DEFICIT HYPERACTIVITY DISORDER (ADHD), PREDOMINANTLY INATTENTIVE TYPE: ICD-10-CM

## 2019-09-20 NOTE — TELEPHONE ENCOUNTER
Notify patient that he will need to establish with [facility] or [facility] for suboxone and controlled substance addiction. I will not be providing prescriptions for this problem. [place]AR[place] report indicates irregular compliance with medication and therefore will not be given any further controlled substances including Vyvanse. Controlled substances monitoring: signs of potential drug abuse or diversion identified- inconsistency noted in compliance with medications and significant substance abuse.

## 2020-05-12 NOTE — CARE COORDINATION
Spoke to Dr. Chitra Coronado and pt can come to his office 9-10-18 at [address] [place] [address]. Nurse and Dr. Barrington Epps informed. No

## 2020-07-05 ENCOUNTER — HOSPITAL ENCOUNTER (INPATIENT)
Age: 40
LOS: 1 days | Discharge: HOME HEALTH | DRG: 812 | End: 2020-07-06
Attending: EMERGENCY MEDICINE | Admitting: INTERNAL MEDICINE
Payer: MEDICAID

## 2020-07-05 ENCOUNTER — APPOINTMENT (OUTPATIENT)
Dept: GENERAL RADIOLOGY | Age: 40
DRG: 812 | End: 2020-07-05
Payer: MEDICAID

## 2020-07-05 LAB
ABSOLUTE EOS #: 0.03 K/UL (ref 0–0.44)
ABSOLUTE IMMATURE GRANULOCYTE: 0.13 K/UL (ref 0–0.3)
ABSOLUTE LYMPH #: 1.56 K/UL (ref 1.1–3.7)
ABSOLUTE MONO #: 0.71 K/UL (ref 0.1–1.2)
ALBUMIN SERPL-MCNC: 3.8 G/DL (ref 3.5–5.2)
ALBUMIN/GLOBULIN RATIO: ABNORMAL (ref 1–2.5)
ALP BLD-CCNC: 69 U/L (ref 40–129)
ALT SERPL-CCNC: 36 U/L (ref 5–41)
ANION GAP SERPL CALCULATED.3IONS-SCNC: 11 MMOL/L (ref 9–17)
AST SERPL-CCNC: 20 U/L
BASOPHILS # BLD: 0 % (ref 0–2)
BASOPHILS ABSOLUTE: <0.03 K/UL (ref 0–0.2)
BILIRUB SERPL-MCNC: 0.33 MG/DL (ref 0.3–1.2)
BUN BLDV-MCNC: 13 MG/DL (ref 6–20)
BUN/CREAT BLD: 19 (ref 9–20)
CALCIUM SERPL-MCNC: 9.2 MG/DL (ref 8.6–10.4)
CHLORIDE BLD-SCNC: 98 MMOL/L (ref 98–107)
CO2: 29 MMOL/L (ref 20–31)
CREAT SERPL-MCNC: 0.7 MG/DL (ref 0.7–1.2)
D-DIMER QUANTITATIVE: 0.74 MG/L FEU (ref 0–0.59)
DIFFERENTIAL TYPE: ABNORMAL
EOSINOPHILS RELATIVE PERCENT: 0 % (ref 1–4)
ETHANOL PERCENT: <0.01 %
ETHANOL: <10 MG/DL
FIO2: ABNORMAL
GFR AFRICAN AMERICAN: >60 ML/MIN
GFR NON-AFRICAN AMERICAN: >60 ML/MIN
GFR SERPL CREATININE-BSD FRML MDRD: ABNORMAL ML/MIN/{1.73_M2}
GFR SERPL CREATININE-BSD FRML MDRD: ABNORMAL ML/MIN/{1.73_M2}
GLUCOSE BLD-MCNC: 104 MG/DL (ref 70–99)
HCO3 VENOUS: 32.3 MMOL/L (ref 24–30)
HCT VFR BLD CALC: 44.1 % (ref 40.7–50.3)
HEMOGLOBIN: 13.4 G/DL (ref 13–17)
IMMATURE GRANULOCYTES: 2 %
LYMPHOCYTES # BLD: 18 % (ref 24–43)
MCH RBC QN AUTO: 27.6 PG (ref 25.2–33.5)
MCHC RBC AUTO-ENTMCNC: 30.4 G/DL (ref 28.4–34.8)
MCV RBC AUTO: 90.7 FL (ref 82.6–102.9)
MONOCYTES # BLD: 8 % (ref 3–12)
NEGATIVE BASE EXCESS, VEN: ABNORMAL (ref 0–2)
NRBC AUTOMATED: 0 PER 100 WBC
O2 DEVICE/FLOW/%: ABNORMAL
O2 SAT, VEN: 89 %
PATIENT TEMP: ABNORMAL
PCO2, VEN: 54 MM HG (ref 39–55)
PDW BLD-RTO: 14.7 % (ref 11.8–14.4)
PH VENOUS: 7.39 (ref 7.32–7.42)
PLATELET # BLD: 157 K/UL (ref 138–453)
PLATELET ESTIMATE: ABNORMAL
PMV BLD AUTO: 10.4 FL (ref 8.1–13.5)
PO2, VEN: 59 MM HG (ref 30–50)
POC PCO2 TEMP: ABNORMAL MM HG
POC PH TEMP: ABNORMAL
POC PO2 TEMP: ABNORMAL MM HG
POSITIVE BASE EXCESS, VEN: 7 (ref 0–2)
POTASSIUM SERPL-SCNC: 4.3 MMOL/L (ref 3.7–5.3)
RBC # BLD: 4.86 M/UL (ref 4.21–5.77)
RBC # BLD: ABNORMAL 10*6/UL
SARS-COV-2, PCR: NORMAL
SARS-COV-2, RAPID: NOT DETECTED
SARS-COV-2: NORMAL
SEG NEUTROPHILS: 72 % (ref 36–65)
SEGMENTED NEUTROPHILS ABSOLUTE COUNT: 6.26 K/UL (ref 1.5–8.1)
SODIUM BLD-SCNC: 138 MMOL/L (ref 135–144)
SOURCE: NORMAL
TOTAL CO2, VENOUS: 34 MMOL/L (ref 25–31)
TOTAL PROTEIN: 6.8 G/DL (ref 6.4–8.3)
WBC # BLD: 8.7 K/UL (ref 3.5–11.3)
WBC # BLD: ABNORMAL 10*3/UL

## 2020-07-05 PROCEDURE — 96360 HYDRATION IV INFUSION INIT: CPT

## 2020-07-05 PROCEDURE — U0002 COVID-19 LAB TEST NON-CDC: HCPCS

## 2020-07-05 PROCEDURE — 85379 FIBRIN DEGRADATION QUANT: CPT

## 2020-07-05 PROCEDURE — 85025 COMPLETE CBC W/AUTO DIFF WBC: CPT

## 2020-07-05 PROCEDURE — 36415 COLL VENOUS BLD VENIPUNCTURE: CPT

## 2020-07-05 PROCEDURE — G0480 DRUG TEST DEF 1-7 CLASSES: HCPCS

## 2020-07-05 PROCEDURE — 80053 COMPREHEN METABOLIC PANEL: CPT

## 2020-07-05 PROCEDURE — 71045 X-RAY EXAM CHEST 1 VIEW: CPT

## 2020-07-05 PROCEDURE — 2580000003 HC RX 258: Performed by: EMERGENCY MEDICINE

## 2020-07-05 PROCEDURE — 99285 EMERGENCY DEPT VISIT HI MDM: CPT

## 2020-07-05 PROCEDURE — 82805 BLOOD GASES W/O2 SATURATION: CPT

## 2020-07-05 PROCEDURE — 82803 BLOOD GASES ANY COMBINATION: CPT

## 2020-07-05 PROCEDURE — 80307 DRUG TEST PRSMV CHEM ANLYZR: CPT

## 2020-07-05 RX ORDER — 0.9 % SODIUM CHLORIDE 0.9 %
1000 INTRAVENOUS SOLUTION INTRAVENOUS ONCE
Status: COMPLETED | OUTPATIENT
Start: 2020-07-05 | End: 2020-07-06

## 2020-07-05 RX ADMIN — SODIUM CHLORIDE 1000 ML: 9 INJECTION, SOLUTION INTRAVENOUS at 23:21

## 2020-07-05 ASSESSMENT — ENCOUNTER SYMPTOMS
DIARRHEA: 0
EYE DISCHARGE: 0
ABDOMINAL PAIN: 0
EYE REDNESS: 0
COLOR CHANGE: 0
SHORTNESS OF BREATH: 0
CONSTIPATION: 0
NAUSEA: 1
COUGH: 0
FACIAL SWELLING: 0
VOMITING: 1

## 2020-07-05 NOTE — ED NOTES
Pt to ed via ems for overdose. Pt injected fentanyl prior to arrival. Per ems pt was given 2mg of narcan and 4 mg of zofran. Pt states he last used 2 days ago. Pt a&ox3. Skin warm and dry. Respirations even and non-labored.       Rommel Diaz RN  07/05/20 [id number]

## 2020-07-05 NOTE — ED NOTES
Pt ambulated around unit before ambulation pt was on 2 liters nasal canula and 02 sat was 97 pt ambulated around unit and returned to his room pts 02 sat on 2 liters nasal canula is 83 pt placed on 2 liters nasal canula respirations are even and unlabored      Maxim Elias RN  07/05/20 [id number]

## 2020-07-05 NOTE — ED PROVIDER NOTES
[address] [place] [address] ED  EMERGENCY DEPARTMENT ENCOUNTER      Pt Name: Dennis Red  MRN: [id number]  [place] 1980  Date of evaluation: 7/5/2020  Provider: Latosha Rome MD    CHIEF COMPLAINT       Chief Complaint   Patient presents with    Drug Overdose         HISTORY OF PRESENT ILLNESS  (Location/Symptom, Timing/Onset, Context/Setting, Quality, Duration, Modifying Factors, Severity.)   Dennis Red is a 38 y.o. male who presents to the emergency department after a fentanyl overdose. He was found unresponsive in the bathroom in his residence by family and they called paramedics. He was administered 2 mg of IV Narcan and he woke up. He vomited in route. He has no physical complaints. He apologizes for wasting our time. He has no chest pain or fever and there was no trauma. This happened just before coming into the emergency department. Nursing Notes were reviewed. ALLERGIES     Sulfa antibiotics    CURRENT MEDICATIONS       Previous Medications    AMPHETAMINE-DEXTROAMPHETAMINE (ADDERALL, 20MG,) 20 MG TABLET    Take 1 tablet by mouth 3 times daily for 30 days. LISDEXAMFETAMINE (VYVANSE) 70 MG CAPSULE    Take 1 capsule by mouth every morning for 30 days.     TAMSULOSIN (FLOMAX) 0.4 MG CAPSULE    Take 2 capsules by mouth daily    VENLAFAXINE (EFFEXOR XR) 150 MG EXTENDED RELEASE CAPSULE    Take 1 capsule by mouth daily    VENLAFAXINE (EFFEXOR XR) 75 MG EXTENDED RELEASE CAPSULE    Take 3 capsules by mouth daily       PAST MEDICAL HISTORY         Diagnosis Date    ADHD (attention deficit hyperactivity disorder)     Anxiety     Bipolar disorder (HCC)     Depression     GERD (gastroesophageal reflux disease)     Irritable bowel syndrome     Substance abuse ([address]        SURGICAL HISTORY           Procedure Laterality Date    ADENOIDECTOMY      COLONOSCOPY      DENTAL SURGERY      FRACTURE SURGERY      HYDROCELE EXCISION Left     LEG SURGERY      WISDOM TOOTH EXTRACTION           FAMILY HISTORY History reviewed. No pertinent family history. Family Status   Relation Name Status    Mother  Alive        SOCIAL HISTORY      reports that he has been smoking cigarettes. He has a 25.00 pack-year smoking history. He has never used smokeless tobacco. He reports current alcohol use. He reports current drug use. Drug: Marijuana. REVIEW OF SYSTEMS    (2-9 systems for level 4, 10 or more for level 5)     Review of Systems   Constitutional: Negative for chills, fatigue and fever. HENT: Negative for congestion, ear discharge and facial swelling. Eyes: Negative for discharge and redness. Respiratory: Negative for cough and shortness of breath. Cardiovascular: Negative for chest pain. Gastrointestinal: Positive for nausea and vomiting. Negative for abdominal pain, constipation and diarrhea. Genitourinary: Negative for dysuria and hematuria. Musculoskeletal: Negative for arthralgias. Skin: Negative for color change and rash. Neurological: Negative for syncope, numbness and headaches. Hematological: Negative for adenopathy. Psychiatric/Behavioral: Negative for confusion. The patient is not nervous/anxious. Except as noted above the remainder of the review of systems was reviewed and negative. PHYSICAL EXAM    (up to 7 for level 4, 8 or more for level 5)     Vitals:    07/05/20 1808   BP: (!) 145/90   Pulse: 99   Resp: 14   Temp: 98.7 °F (37.1 °C)   TempSrc: Oral   SpO2: 98%   Weight: 210 lb (95.3 kg)   Height: 6' 5\" (1.956 m)       Physical Exam  Vitals signs reviewed. Constitutional:       General: He is not in acute distress. Appearance: He is well-developed. He is not diaphoretic. HENT:      Head: Normocephalic and atraumatic. Eyes:      General: No scleral icterus. Right eye: No discharge. Left eye: No discharge. Neck:      Musculoskeletal: Neck supple. Cardiovascular:      Rate and Rhythm: Normal rate and regular rhythm.    Pulmonary:      Effort: Pulmonary effort is normal. No respiratory distress. Breath sounds: Normal breath sounds. No stridor. No wheezing or rales. Abdominal:      General: There is no distension. Palpations: Abdomen is soft. Tenderness: There is no abdominal tenderness. Musculoskeletal: Normal range of motion. Lymphadenopathy:      Cervical: No cervical adenopathy. Skin:     General: Skin is warm and dry. Findings: No erythema or rash. Neurological:      Mental Status: He is alert and oriented to person, place, and time. Psychiatric:         Behavior: Behavior normal.             DIAGNOSTIC RESULTS     EKG: All EKG's are interpreted by the Emergency Department Physician who either signs or Co-signs this chart in the absence of a cardiologist.    RADIOLOGY:   Non-plain film images such as CT, Ultrasound and MRI are read by the radiologist. Plain radiographic images are visualized and preliminarily interpreted by the emergency physician with the below findings:    Interpretation per the Radiologist below, if available at the time of this note:        LABS:  Labs Reviewed - No data to display    All other labs were within normal range or not returned as of this dictation. EMERGENCY DEPARTMENT COURSE and DIFFERENTIAL DIAGNOSIS/MDM:   Vitals:    Vitals:    07/05/20 1808   BP: (!) 145/90   Pulse: 99   Resp: 14   Temp: 98.7 °F (37.1 °C)   TempSrc: Oral   SpO2: 98%   Weight: 210 lb (95.3 kg)   Height: 6' 5\" (1.956 m)       No orders of the defined types were placed in this encounter. Medical Decision Making: The patient is overdosed and is awake. He is being watched here in the emergency department. CONSULTS:  None    PROCEDURES:  None    FINAL IMPRESSION      1.  Opiate overdose, accidental or unintentional, initial encounter [facility])          [address] Discharge - Pending Orders Complete 07/05/2020 08:25:47 PM      PATIENT REFERRED TO:   Fracisco Mosher MD  [address] [facility] [address] [place]       As needed    [facility] ED  [address] [facility]  [phone]    If symptoms worsen      DISCHARGE MEDICATIONS:     New Prescriptions    No medications on file         (Please note that portions of this note were completed with a voice recognition program.  Efforts were made to edit the dictations but occasionally words are mis-transcribed.)    Gabriela Irvin MD  Attending Emergency Physician           Gabriela Irvin MD  07/05/20 2026

## 2020-07-06 ENCOUNTER — APPOINTMENT (OUTPATIENT)
Dept: CT IMAGING | Age: 40
DRG: 812 | End: 2020-07-06
Payer: MEDICAID

## 2020-07-06 VITALS
WEIGHT: 210.1 LBS | BODY MASS INDEX: 24.81 KG/M2 | DIASTOLIC BLOOD PRESSURE: 79 MMHG | OXYGEN SATURATION: 95 % | RESPIRATION RATE: 16 BRPM | HEIGHT: 77 IN | HEART RATE: 79 BPM | SYSTOLIC BLOOD PRESSURE: 147 MMHG | TEMPERATURE: 98.7 F

## 2020-07-06 PROBLEM — R09.02 HYPOXIA: Status: ACTIVE | Noted: 2020-07-06

## 2020-07-06 PROBLEM — J96.02 ACUTE RESPIRATORY FAILURE WITH HYPOXIA AND HYPERCAPNIA (HCC): Status: ACTIVE | Noted: 2020-07-06

## 2020-07-06 PROBLEM — J96.01 ACUTE RESPIRATORY FAILURE WITH HYPOXIA AND HYPERCAPNIA (HCC): Status: ACTIVE | Noted: 2020-07-06

## 2020-07-06 PROCEDURE — APPSS180 APP SPLIT SHARED TIME > 60 MINUTES: Performed by: NURSE PRACTITIONER

## 2020-07-06 PROCEDURE — G0378 HOSPITAL OBSERVATION PER HR: HCPCS

## 2020-07-06 PROCEDURE — 2060000000 HC ICU INTERMEDIATE R&B

## 2020-07-06 PROCEDURE — 6370000000 HC RX 637 (ALT 250 FOR IP): Performed by: NURSE PRACTITIONER

## 2020-07-06 PROCEDURE — 96372 THER/PROPH/DIAG INJ SC/IM: CPT

## 2020-07-06 PROCEDURE — 97116 GAIT TRAINING THERAPY: CPT

## 2020-07-06 PROCEDURE — 71260 CT THORAX DX C+: CPT

## 2020-07-06 PROCEDURE — 6370000000 HC RX 637 (ALT 250 FOR IP): Performed by: FAMILY MEDICINE

## 2020-07-06 PROCEDURE — 2580000003 HC RX 258: Performed by: EMERGENCY MEDICINE

## 2020-07-06 PROCEDURE — 97535 SELF CARE MNGMENT TRAINING: CPT

## 2020-07-06 PROCEDURE — 6360000002 HC RX W HCPCS: Performed by: NURSE PRACTITIONER

## 2020-07-06 PROCEDURE — 97162 PT EVAL MOD COMPLEX 30 MIN: CPT

## 2020-07-06 PROCEDURE — 99222 1ST HOSP IP/OBS MODERATE 55: CPT | Performed by: INTERNAL MEDICINE

## 2020-07-06 PROCEDURE — 6370000000 HC RX 637 (ALT 250 FOR IP): Performed by: INTERNAL MEDICINE

## 2020-07-06 PROCEDURE — 6360000004 HC RX CONTRAST MEDICATION: Performed by: EMERGENCY MEDICINE

## 2020-07-06 PROCEDURE — 97165 OT EVAL LOW COMPLEX 30 MIN: CPT

## 2020-07-06 PROCEDURE — 96361 HYDRATE IV INFUSION ADD-ON: CPT

## 2020-07-06 RX ORDER — IPRATROPIUM BROMIDE AND ALBUTEROL SULFATE 2.5; .5 MG/3ML; MG/3ML
1 SOLUTION RESPIRATORY (INHALATION)
Status: DISCONTINUED | OUTPATIENT
Start: 2020-07-06 | End: 2020-07-06

## 2020-07-06 RX ORDER — FAMOTIDINE 20 MG/1
20 TABLET, FILM COATED ORAL 2 TIMES DAILY
Status: DISCONTINUED | OUTPATIENT
Start: 2020-07-06 | End: 2020-07-06 | Stop reason: HOSPADM

## 2020-07-06 RX ORDER — DEXTROAMPHETAMINE SACCHARATE, AMPHETAMINE ASPARTATE, DEXTROAMPHETAMINE SULFATE AND AMPHETAMINE SULFATE 5; 5; 5; 5 MG/1; MG/1; MG/1; MG/1
20 TABLET ORAL 3 TIMES DAILY
Status: DISCONTINUED | OUTPATIENT
Start: 2020-07-06 | End: 2020-07-06 | Stop reason: HOSPADM

## 2020-07-06 RX ORDER — CEPHALEXIN 250 MG/1
250 CAPSULE ORAL EVERY 6 HOURS SCHEDULED
Status: DISCONTINUED | OUTPATIENT
Start: 2020-07-06 | End: 2020-07-06

## 2020-07-06 RX ORDER — SODIUM CHLORIDE 0.9 % (FLUSH) 0.9 %
10 SYRINGE (ML) INJECTION PRN
Status: DISCONTINUED | OUTPATIENT
Start: 2020-07-06 | End: 2020-07-06 | Stop reason: HOSPADM

## 2020-07-06 RX ORDER — SODIUM CHLORIDE 0.9 % (FLUSH) 0.9 %
10 SYRINGE (ML) INJECTION ONCE
Status: COMPLETED | OUTPATIENT
Start: 2020-07-06 | End: 2020-07-06

## 2020-07-06 RX ORDER — TAMSULOSIN HYDROCHLORIDE 0.4 MG/1
0.8 CAPSULE ORAL DAILY
Status: DISCONTINUED | OUTPATIENT
Start: 2020-07-06 | End: 2020-07-06 | Stop reason: HOSPADM

## 2020-07-06 RX ORDER — VENLAFAXINE HYDROCHLORIDE 75 MG/1
225 CAPSULE, EXTENDED RELEASE ORAL DAILY
Status: DISCONTINUED | OUTPATIENT
Start: 2020-07-06 | End: 2020-07-06 | Stop reason: HOSPADM

## 2020-07-06 RX ORDER — CLINDAMYCIN HYDROCHLORIDE 150 MG/1
300 CAPSULE ORAL EVERY 8 HOURS SCHEDULED
Status: DISCONTINUED | OUTPATIENT
Start: 2020-07-06 | End: 2020-07-06 | Stop reason: HOSPADM

## 2020-07-06 RX ORDER — NICOTINE 21 MG/24HR
1 PATCH, TRANSDERMAL 24 HOURS TRANSDERMAL DAILY PRN
Status: DISCONTINUED | OUTPATIENT
Start: 2020-07-06 | End: 2020-07-06 | Stop reason: HOSPADM

## 2020-07-06 RX ORDER — ACETAMINOPHEN 650 MG/1
650 SUPPOSITORY RECTAL EVERY 6 HOURS PRN
Status: DISCONTINUED | OUTPATIENT
Start: 2020-07-06 | End: 2020-07-06 | Stop reason: HOSPADM

## 2020-07-06 RX ORDER — ALBUTEROL SULFATE 2.5 MG/3ML
2.5 SOLUTION RESPIRATORY (INHALATION)
Status: DISCONTINUED | OUTPATIENT
Start: 2020-07-06 | End: 2020-07-06

## 2020-07-06 RX ORDER — SODIUM CHLORIDE 0.9 % (FLUSH) 0.9 %
10 SYRINGE (ML) INJECTION EVERY 12 HOURS SCHEDULED
Status: DISCONTINUED | OUTPATIENT
Start: 2020-07-06 | End: 2020-07-06 | Stop reason: HOSPADM

## 2020-07-06 RX ORDER — POLYETHYLENE GLYCOL 3350 17 G/17G
17 POWDER, FOR SOLUTION ORAL DAILY PRN
Status: DISCONTINUED | OUTPATIENT
Start: 2020-07-06 | End: 2020-07-06 | Stop reason: HOSPADM

## 2020-07-06 RX ORDER — PROMETHAZINE HYDROCHLORIDE 12.5 MG/1
12.5 TABLET ORAL EVERY 6 HOURS PRN
Status: DISCONTINUED | OUTPATIENT
Start: 2020-07-06 | End: 2020-07-06 | Stop reason: HOSPADM

## 2020-07-06 RX ORDER — ACETAMINOPHEN 325 MG/1
650 TABLET ORAL EVERY 6 HOURS PRN
Status: DISCONTINUED | OUTPATIENT
Start: 2020-07-06 | End: 2020-07-06 | Stop reason: HOSPADM

## 2020-07-06 RX ORDER — CLONIDINE HYDROCHLORIDE 0.1 MG/1
0.1 TABLET ORAL ONCE
Status: COMPLETED | OUTPATIENT
Start: 2020-07-06 | End: 2020-07-06

## 2020-07-06 RX ORDER — SODIUM CHLORIDE 9 MG/ML
INJECTION, SOLUTION INTRAVENOUS CONTINUOUS
Status: DISCONTINUED | OUTPATIENT
Start: 2020-07-06 | End: 2020-07-06

## 2020-07-06 RX ORDER — ONDANSETRON 2 MG/ML
4 INJECTION INTRAMUSCULAR; INTRAVENOUS EVERY 6 HOURS PRN
Status: DISCONTINUED | OUTPATIENT
Start: 2020-07-06 | End: 2020-07-06 | Stop reason: HOSPADM

## 2020-07-06 RX ORDER — CLINDAMYCIN HYDROCHLORIDE 300 MG/1
300 CAPSULE ORAL EVERY 8 HOURS SCHEDULED
Qty: 21 CAPSULE | Refills: 0 | Status: SHIPPED | OUTPATIENT
Start: 2020-07-06 | End: 2020-07-13

## 2020-07-06 RX ORDER — 0.9 % SODIUM CHLORIDE 0.9 %
80 INTRAVENOUS SOLUTION INTRAVENOUS ONCE
Status: COMPLETED | OUTPATIENT
Start: 2020-07-06 | End: 2020-07-06

## 2020-07-06 RX ORDER — ALBUTEROL SULFATE 2.5 MG/3ML
2.5 SOLUTION RESPIRATORY (INHALATION)
Status: DISCONTINUED | OUTPATIENT
Start: 2020-07-06 | End: 2020-07-06 | Stop reason: HOSPADM

## 2020-07-06 RX ORDER — NALOXONE HYDROCHLORIDE 0.4 MG/ML
0.4 INJECTION, SOLUTION INTRAMUSCULAR; INTRAVENOUS; SUBCUTANEOUS PRN
Status: DISCONTINUED | OUTPATIENT
Start: 2020-07-06 | End: 2020-07-06 | Stop reason: HOSPADM

## 2020-07-06 RX ORDER — METOPROLOL TARTRATE 5 MG/5ML
5 INJECTION INTRAVENOUS EVERY 6 HOURS PRN
Status: DISCONTINUED | OUTPATIENT
Start: 2020-07-06 | End: 2020-07-06 | Stop reason: HOSPADM

## 2020-07-06 RX ADMIN — Medication 10 ML: at 01:48

## 2020-07-06 RX ADMIN — VENLAFAXINE HYDROCHLORIDE 225 MG: 75 CAPSULE, EXTENDED RELEASE ORAL at 08:17

## 2020-07-06 RX ADMIN — CLONIDINE HYDROCHLORIDE 0.1 MG: 0.1 TABLET ORAL at 16:42

## 2020-07-06 RX ADMIN — PROMETHAZINE HYDROCHLORIDE 12.5 MG: 12.5 TABLET ORAL at 13:40

## 2020-07-06 RX ADMIN — SODIUM CHLORIDE 80 ML: 9 INJECTION, SOLUTION INTRAVENOUS at 01:48

## 2020-07-06 RX ADMIN — TAMSULOSIN HYDROCHLORIDE 0.8 MG: 0.4 CAPSULE ORAL at 08:17

## 2020-07-06 RX ADMIN — CEPHALEXIN 250 MG: 250 CAPSULE ORAL at 08:16

## 2020-07-06 RX ADMIN — ENOXAPARIN SODIUM 40 MG: 40 INJECTION SUBCUTANEOUS at 08:17

## 2020-07-06 RX ADMIN — IOPAMIDOL 75 ML: 755 INJECTION, SOLUTION INTRAVENOUS at 01:48

## 2020-07-06 RX ADMIN — CLINDAMYCIN HYDROCHLORIDE 300 MG: 150 CAPSULE ORAL at 14:50

## 2020-07-06 RX ADMIN — FAMOTIDINE 20 MG: 20 TABLET ORAL at 08:17

## 2020-07-06 ASSESSMENT — PAIN DESCRIPTION - FREQUENCY
FREQUENCY: CONTINUOUS
FREQUENCY: CONTINUOUS

## 2020-07-06 ASSESSMENT — ENCOUNTER SYMPTOMS
CONSTIPATION: 0
DIARRHEA: 0
SHORTNESS OF BREATH: 0
WHEEZING: 0
COUGH: 0
BLOOD IN STOOL: 0
NAUSEA: 1
CHEST TIGHTNESS: 0
VOMITING: 1
COLOR CHANGE: 1
ABDOMINAL PAIN: 1

## 2020-07-06 ASSESSMENT — PAIN - FUNCTIONAL ASSESSMENT
PAIN_FUNCTIONAL_ASSESSMENT: ACTIVITIES ARE NOT PREVENTED
PAIN_FUNCTIONAL_ASSESSMENT: ACTIVITIES ARE NOT PREVENTED

## 2020-07-06 ASSESSMENT — PAIN DESCRIPTION - PROGRESSION
CLINICAL_PROGRESSION: GRADUALLY IMPROVING
CLINICAL_PROGRESSION: GRADUALLY WORSENING
CLINICAL_PROGRESSION: GRADUALLY IMPROVING

## 2020-07-06 ASSESSMENT — PAIN DESCRIPTION - DESCRIPTORS
DESCRIPTORS: ACHING;CONSTANT;DULL
DESCRIPTORS: CONSTANT;ACHING

## 2020-07-06 ASSESSMENT — PAIN SCALES - GENERAL
PAINLEVEL_OUTOF10: 0
PAINLEVEL_OUTOF10: 6
PAINLEVEL_OUTOF10: 2

## 2020-07-06 ASSESSMENT — PAIN DESCRIPTION - PAIN TYPE
TYPE: ACUTE PAIN
TYPE: ACUTE PAIN

## 2020-07-06 ASSESSMENT — PAIN DESCRIPTION - LOCATION
LOCATION: HEAD
LOCATION: HEAD

## 2020-07-06 ASSESSMENT — PAIN DESCRIPTION - ONSET
ONSET: ON-GOING
ONSET: ON-GOING

## 2020-07-06 NOTE — PROGRESS NOTES
Home Oxygen Evaluation was repeated this pm  Pt did not meet qualification for 02. Home Oxygen Evaluation completed. Patient is on NA liters per minute via NA  Resting SpO2 = NA%  Resting SpO2 on room air = 95%    SpO2 on room air with exercise = 93%  SpO2 on oxygen as above with exercise = NA%    Nocturnal Oximetry with patient on room air is recommended is SpO2 is between 89% and 95% (requires additional order).     Chana Gosselin  3:09 PM

## 2020-07-06 NOTE — PROGRESS NOTES
CLINICAL PHARMACY NOTE: MEDS TO [address] Select Patient?: Yes  Total # of Prescriptions Filled: 1   The following medications were delivered to the patient:  · CLINDAMYCIN 300 MG CAPS  Total # of Interventions Completed: 0  Time Spent (min): 0    Additional Documentation:  PT ALSO HAS NALOXONE RX THAT WAS SENT TO [facility]    PT HAD NO COPAY ON THE CLINDAMYCIN    DELIVERED 7/6/20

## 2020-07-06 NOTE — ED PROVIDER NOTES
normal limits   POC PANEL (G3)-GUERDA - Abnormal; Notable for the following components:    pO2, Guerda 59 (*)     Total CO2, Venous 34 (*)     HCO3, Venous 32.3 (*)     Positive Base Excess, Guerda 7 (*)     All other components within normal limits   D-DIMER, QUANTITATIVE - Abnormal; Notable for the following components:    D-Dimer, Quant 0.74 (*)     All other components within normal limits   ETHANOL   COVID-19   BLOOD GAS, VENOUS   URINE DRUG SCREEN       EMERGENCY DEPARTMENTCOURSE:   D-dimer positive. Covid 19 negative. Will obtain CT angio chest to rule out PE.    CTA negative for PE. Patient resting comfortably in exam bed. He speaking in full sentences, clinically does not appear to be in respiratory distress however still desats to 80% when taken off O2. Will admit to hospital for hypoxia. Vitals:    Vitals:    07/05/20 1808 07/05/20 2049 07/06/20 0001 07/06/20 0255   BP: (!) 145/90      Pulse: 99 86  70   Resp: 14 20 14 16   Temp: 98.7 °F (37.1 °C)      TempSrc: Oral      SpO2: 98% 97% 97% 95%   Weight: 210 lb (95.3 kg)      Height: 6' 5\" (1.956 m)          The patient was given the following medications while in the emergency department:  Orders Placed This Encounter   Medications    0.9 % sodium chloride bolus    0.9 % sodium chloride bolus    sodium chloride flush 0.9 % injection 10 mL    iopamidol (ISOVUE-370) 76 % injection 75 mL     CONSULTS:  IP CONSULT TO INTERNAL MEDICINE    FINAL IMPRESSION      1. Opiate overdose, accidental or unintentional, initial encounter ([facility] [address]    2. Accidental fentanyl overdose, initial encounter ([facility] [address]    3.  Hypoxia          DISPOSITION/PLAN   DISPOSITION  07/06/2020 03:28:45 AM      PATIENT REFERRED TO:  Cassia Call MD  [address] [place]       As needed    [facility] ED  [address] [facility] [place] [address] [phone]    If symptoms worsen    Cassia Call, [address] [facility] [address]  [address]

## 2020-07-06 NOTE — CARE COORDINATION
Social work: Phone call back from [facility] physician declined patient d/t oxygen needs. If patient does not require oxygen upon exertion, they will reconsider. Vaishnavi/RN updated and will test patient again this afternoon.

## 2020-07-06 NOTE — CARE COORDINATION
Social work: Another call to [facility] to f/u on acceptance- Xiomy stated she has a page out to physician for decision. Writer informed her to contact unit if decision made after hours. RN updated.

## 2020-07-06 NOTE — PLAN OF CARE
Problem: Pain:  Goal: Pain level will decrease  Description: Pain level will decrease  7/6/2020 0554 by Nolan Gonzalez RN  Outcome: Ongoing  Goal: Control of acute pain  Description: Control of acute pain  7/6/2020 1742 by Amparo Lyon RN  Outcome: Ongoing  7/6/2020 0554 by Nolan Gonzalez RN  Outcome: Ongoing  Goal: Control of chronic pain  Description: Control of chronic pain  7/6/2020 0554 by Nolan Gonzalez RN  Outcome: Ongoing     Problem: Infection:  Goal: Will remain free from infection  Description: Will remain free from infection  7/6/2020 1742 by Amparo Lyon RN  Outcome: Ongoing  7/6/2020 0554 by Nolan Gonzalez RN  Outcome: Ongoing     Problem: Safety:  Goal: Free from accidental physical injury  Description: Free from accidental physical injury  7/6/2020 1742 by Amparo Lyon RN  Outcome: Ongoing  7/6/2020 0554 by Nolan Gonzalez RN  Outcome: Ongoing  Goal: Free from intentional harm  Description: Free from intentional harm  7/6/2020 0554 by Nolan Gonzalez RN  Outcome: Ongoing     Problem: Daily Care:  Goal: Daily care needs are met  Description: Daily care needs are met  7/6/2020 1742 by Amparo Lyon RN  Outcome: Ongoing  7/6/2020 0554 by Nolan Gonzalez RN  Outcome: Ongoing     Problem: Skin Integrity:  Goal: Skin integrity will stabilize  Description: Skin integrity will stabilize  7/6/2020 1742 by Amparo Lyon RN  Outcome: Ongoing  7/6/2020 0554 by Nolan Gonzalez RN  Outcome: Ongoing     Problem: Discharge Planning:  Goal: Patients continuum of care needs are met  Description: Patients continuum of care needs are met  7/6/2020 1742 by Amparo Lyon RN  Outcome: Ongoing  7/6/2020 0554 by Nolan Gonzalez RN  Outcome: Ongoing

## 2020-07-06 NOTE — DISCHARGE SUMMARY
[facility]    Discharge Summary     Patient ID: Jami Coles  :  1980   MRN: [id number]     ACCOUNT:  [de-identified]   Patient's PCP: Stephanie Tony MD  Admit Date: 2020   Discharge Date:   2020   Discharge Physician: Laverne Thomas DO     The patient was seen and examined on day of discharge and this discharge summary is in conjunction with any daily progress note from day of discharge. Active Discharge Diagnoses:     Primary Problem  Accidental fentanyl overdose [facility])      [facility]    Diagnosis Date Noted    Acute respiratory failure with hypoxia and hypercapnia (HCC) [J96.01, J96.02] 2020    Hypoxia [R09.02]     Accidental fentanyl overdose ([address] [T40.4X1A]     Tobacco dependence [F17.200]     Substance abuse (HCC) [F19.10]     Cellulitis of left upper extremity [L03.114]     GERD (gastroesophageal reflux disease) [K21.9]          Hospital Course:     Brief History:  As documented in the medical record: \"The patient reports to the hospital with complaint of drug overdose. He states he injected fentanyl today. He has little recollection of the event, but states that he injected fentanyl earlier yesterday. He was reportedly found unresponsive by his family, who called EMS. He was given narcan with improvement in mentation. However, he was found to be hypoxic in the ER with spo2 levels of 78% while ambulating. His oxygen saturations improved satisfactorily with supplemental oxygen. The patient endorses chronic abdominal pain that he states is generalized, moderate, dull and intermittent. He also reports resolved nausea and vomiting. He has a small area of erythema to his left forearm, he states he injected drugs to this area 2-3 days ago. He reports feeling anxious. He denies fever or chills. No additional symptomology or modifying factors.  He denies suicidal ideation and states the overdose was accidental. He has past medical history that includes IBS, GERD, ADHD, bipolar disorder, anxiety and depression. He is a current every day cigarette smoker. Rapid SARS-CoV-2 negative. D dimer 0.74. CTA chest indicates no evidence of pulmonary embolism or acute pulmonary abnormality. The initial plan included:  Detox  Antibiotics per C&S Results     The patient responded well to therapy  DC planning was initiated  The patient was instructed to follow up with their PCP, Dawn Huerta MD in one week      Discharge plan:     Home oxygen evaluation to be done   consulted for detox program  [place] for his cellulitis  Smoking cessation / education   Will discharge when arrangements complete and ok with other services. Follow-up with PCP in one week, Dawn Huerta MD  Narcan Rx for home provided     No discharge procedures on file. Significant Diagnostic Studies:     Labs / Micro:  Labs:  Hematology:  Recent Labs     07/05/20  2211   WBC 8.7   RBC 4.86   HGB 13.4   HCT 44.1   MCV 90.7   MCH 27.6   MCHC 30.4   RDW 14.7*      MPV 10.4   DDIMER 0.74*     Chemistry:  Recent Labs     07/05/20  2211      K 4.3   CL 98   CO2 29   GLUCOSE 104*   BUN 13   CREATININE 0.70   ANIONGAP 11   LABGLOM >60   GFRAA >60   CALCIUM 9.2     Recent Labs     07/05/20  2211   PROT 6.8   LABALBU 3.8   AST 20   ALT 36   ALKPHOS 69   BILITOT 0.33         Radiology:    Xr Chest Portable    Result Date: 7/5/2020  EXAMINATION: ONE XRAY VIEW OF THE CHEST 7/5/2020 7:53 pm COMPARISON: 08/20/2018, 02/16/2018 HISTORY: ORDERING SYSTEM PROVIDED HISTORY: sob TECHNOLOGIST PROVIDED HISTORY: sob Reason for Exam: Overdose, sob today Acuity: Acute Type of Exam: Initial FINDINGS: Shallow inflation and rotation. The cardiomediastinal silhouette is within normal limits. There is no consolidation, pneumothorax or evidence for edema. No evidence for effusion.   No acute osseous abnormality is identified. No acute airspace disease identified. Ct Chest Pulmonary Embolism W Contrast    Result Date: 7/6/2020  EXAMINATION: CTA OF THE CHEST 7/6/2020 12:25 am TECHNIQUE: CTA of the chest was performed after the administration of intravenous contrast.  Multiplanar reformatted images are provided for review. MIP images are provided for review. Dose modulation, iterative reconstruction, and/or weight based adjustment of the mA/kV was utilized to reduce the radiation dose to as low as reasonably achievable. COMPARISON: None. HISTORY: ORDERING SYSTEM PROVIDED HISTORY: hypoxia TECHNOLOGIST PROVIDED HISTORY: hypoxia Reason for Exam: elevated D- Dimer Acuity: Unknown Type of Exam: Unknown Additional signs and symptoms: r/o PE FINDINGS: Pulmonary Arteries: Pulmonary arteries are adequately opacified for evaluation. No evidence of intraluminal filling defect to suggest pulmonary embolism. Main pulmonary artery is normal in caliber. Mediastinum: No evidence of mediastinal lymphadenopathy. Multiple small mediastinal and bilateral hilar lymph nodes do not meet CT criteria for pathologic enlargement. The heart and pericardium demonstrate no acute abnormality. There is no acute abnormality of the thoracic aorta. Lungs/pleura: Respiratory motion. Expiratory imaging. Bilateral subsegmental atelectasis. No pulmonary mass or consolidation. No pleural effusion or pneumothorax. No endoluminal masslike lesion. Upper Abdomen: Limited images of the upper abdomen are unremarkable. Soft Tissues/Bones: No acute bone or soft tissue abnormality. No evidence of pulmonary embolism or acute pulmonary abnormality.          Consultations:    Consults:     Final Specialist Recommendations/Findings:   IP CONSULT TO INTERNAL MEDICINE  IP CONSULT TO SOCIAL WORK        Discharged Condition:    Stable     Disposition: Home    Physician Follow Up:   James Hogue MD  [address] [place]       As

## 2020-07-06 NOTE — CARE COORDINATION
Social work: Met with patient at bedside and updated him that [facility] cannot take him on oxygen. SW also informed him referral faxed to [facility] and they are reviewing. SW asked patient is he would agree to [place] or [facility] patient stated those facilities are too far away. Writer asked what plan will be instead- he said \"they'll just have to let me go\" SW asked where he would go, states he is trying to reach his dad. If pt's O2 states improve, [facility] will reconsider.   [facility] is also reviewing referral.

## 2020-07-06 NOTE — CARE COORDINATION
Home O2 eval done and pt will require O2 at 2L with exertion. Aditi Guthrie from [facility] informed. Awaiting script and face to face note.

## 2020-07-06 NOTE — CARE COORDINATION
Star Hensley from [facility] informed to hold off on home O2 until rechecked this afternoon.

## 2020-07-06 NOTE — FLOWSHEET NOTE
Patient resting in bed; expresses no spiritual/emotional needs at this time; thanks writer for visiting. Writer provides presence and support. Spiritual Care will follow as needed.      07/06/20 1115   Encounter Summary   Services provided to: Patient   Referral/Consult From: Rounding   Continue Visiting   (7/6/20)   Complexity of Encounter Low   Length of Encounter 15 minutes   Routine   Type Initial   Assessment Calm   Intervention Active listening   Outcome Expressed gratitude

## 2020-07-06 NOTE — PROGRESS NOTES
Jazmín Marie PPatient Assessment complete. Hypoxia [R09.02] . Vitals:    07/06/20 0800   BP: 119/71   Pulse: 66   Resp: 18   Temp: 97.7 °F (36.5 °C)   SpO2: 95%   . Patients home meds are   Prior to Admission medications    Medication Sig Start Date End Date Taking? Authorizing Provider   amphetamine-dextroamphetamine (ADDERALL, 20MG,) 20 MG tablet Take 1 tablet by mouth 3 times daily for 30 days. 8/1/19 8/31/19  Heather Flynn MD   venlafaxine (EFFEXOR XR) 75 MG extended release capsule Take 3 capsules by mouth daily 7/31/19   Heather Flynn MD   tamsulosin [facility]) 0.4 MG capsule Take 2 capsules by mouth daily 7/31/19   Heather Flynn MD   lisdexamfetamine (VYVANSE) 70 MG capsule Take 1 capsule by mouth every morning for 30 days.  7/31/19 8/30/19  Heather Flynn MD   venlafaxine (EFFEXOR XR) 150 MG extended release capsule Take 1 capsule by mouth daily 7/1/19   Heather Flynn MD   .    Assessment  Pt admitted as Over dose, with hypoxia     Peak Flow (asthma only)    Predicted: na  Personal Best: na  PEF na  % Predicted na  Peak Flow : not applicable = 0    AZU6/EXI    FEV1 Predicted na      FEV1 na    FEV1 % Predicted na  FVC na  IS volume na  IBW na  FIO2% 21 pt resting in bed 02 off  SPO2 95  RR 16  Breath Sounds: clear decreased at bases      · Bronchodilator assessment at level  1  · Hyperinflation assessment at level   · Secretion Management assessment at level    ·   · [x]    Bronchodilator Assessment  BRONCHODILATOR ASSESSMENT SCORE  Score 0 1 2 3 4 5   Breath Sounds   [x]  Patient Baseline []  No Wheeze good aeration []  Faint, scattered wheezing, good aeration []  Expiratory Wheezing and or moderately diminished []  Insp/Exp wheeze and/or very diminished []  Insp/Exp and/ or marked distress   Respiratory Rate   [x]  Patient Baseline []  Less than 20 []  Less than 20 []  20-25 []  Greater than 25 []  Greater than 25   Peak flow % of Pred or PB [x]  NA   []  Greater than 90% []  81-90% []  71-80% []  Less than or equal to 70%  or unable to perform []  Unable due to Respiratory Distress   Dyspnea re []  Patient Baseline [x]  No SOB []  No SOB []  SOB on exertion []  SOB min activity []  At rest/acute   e FEV% Predicted       []  NA []  Above 69%  []  Unable []  Above 60-69%  []  Unable []  Above 50-59%  [x]  Unable []  Above 35-49%  []  Unable []  Less than 35%  []  Unable                 []  Hyperinflation Assessment  Score 1 2 3   CXR and Breath Sounds   []  Clear []  No atelectasis  Basilar aeration []  Atelectasis or absent basilar breath sounds   Incentive Spirometry Volume  (Per IBW)   []  Greater than or equal to 15ml/Kg []  less than 15ml/Kg []  less than 15ml/Kg   Surgery within last 2 weeks []  None or general   []  Abdominal or thoracic surgery  []  Abdominal or thoracic   Chronic Pulmonary Historyre []  No []  Yes []  Yes     []  Secretion Management Assessment  Score 1 2 3   Bilateral Breath Sounds   []  Occasional Rhonchi []  Scattered Rhonchi []  Course Rhonchi and/or poor aeration   Sputum    []  Small amount of thin secretions []  Moderate amount of viscous secretions []  Copius, Viscious Yellow/ Secretions   CXR as reported by physician []  clear  []  Unavailable []  Infiltrates and/or consolidation  []  Unavailable []  Mucus Plugging and or lobar consolidation  []  Unavailable   Cough []  Strong, productive cough []  Weak productive cough []  No cough or weak non-productive cough

## 2020-07-06 NOTE — FLOWSHEET NOTE
Nurse to nurse telephone report completed with Tommy Meza RN at [facility]. Telephone number [phone]. Awaiting transport at this time.

## 2020-07-06 NOTE — PLAN OF CARE
[address] [place] [address]       Second Visit Note  For more detailed information please refer to the progress note of the day      7/6/2020    12:40 PM    Name:   Betyt Younger  MRN:     [id number]     Peggy Buckley:      [de-identified]   Room:   2042/2042-01   Day:  0  Admit Date:  7/5/2020  5:57 PM    PCP:   Blaine Og MD  Code Status:  Full Code    Patient has been seen    Home Oxygen Evaluation completed. Patient is on 2 liters per minute via cannula.   Resting SpO2 = 99%  Resting SpO2 on room air = 95%   SpO2 on room air with exercise = 83%  ( ambulated  400 ft)  SpO2 on oxygen as above with exercise = 97%      CURRENT MEDS:   lisdexamfetamine (VYVANSE) capsule 70 mg, QAM  tamsulosin (FLOMAX) capsule 0.8 mg, Daily  venlafaxine (EFFEXOR XR) extended release capsule 225 mg, Daily  sodium chloride flush 0.9 % injection 10 mL, 2 times per day  sodium chloride flush 0.9 % injection 10 mL, PRN  acetaminophen (TYLENOL) tablet 650 mg, Q6H PRN    Or  acetaminophen (TYLENOL) suppository 650 mg, Q6H PRN  polyethylene glycol (GLYCOLAX) packet 17 g, Daily PRN  promethazine (PHENERGAN) tablet 12.5 mg, Q6H PRN    Or  ondansetron (ZOFRAN) injection 4 mg, Q6H PRN  famotidine (PEPCID) tablet 20 mg, BID  nicotine (NICODERM CQ) 21 MG/24HR 1 patch, Daily PRN  enoxaparin (LOVENOX) injection 40 mg, Daily  albuterol (PROVENTIL) nebulizer solution 2.5 mg, Q2H PRN  naloxone (NARCAN) injection 0.4 mg, PRN  clindamycin (CLEOCIN) capsule 300 mg, 3 times per day  amphetamine-dextroamphetamine (ADDERALL) tablet 20 mg, TID        VITALS:  /71   Pulse 66   Temp 97.7 °F (36.5 °C) (Temporal)   Resp 18   Ht 6' 5.01\" (1.956 m)   Wt 210 lb 1.6 oz (95.3 kg)   SpO2 95%   BMI 24.91 kg/m²     LABS:  Labs:  Hematology:  Recent Labs     07/05/20  2211   WBC 8.7   RBC 4.86   HGB 13.4   HCT 44.1   MCV 90.7   MCH 27.6   MCHC 30.4   RDW 14.7*      MPV 10.4   DDIMER 0.74*     Chemistry:  Recent Labs     07/05/20  2211   NA 138   K 4.3   CL 98   CO2 29   GLUCOSE 104*   BUN 13   CREATININE 0.70   ANIONGAP 11   LABGLOM >60   GFRAA >60   CALCIUM 9.2     Recent Labs     07/05/20  2211   PROT 6.8   LABALBU 3.8   AST 20   ALT 36   ALKPHOS 69   BILITOT 0.33       PROBLEMS:  Principal Problem:    Accidental fentanyl overdose (HCC)  Active Problems:    Hypoxia    Tobacco dependence    Substance abuse (HCC)    Cellulitis of left upper extremity    GERD (gastroesophageal reflux disease)    Acute respiratory failure with hypoxia and hypercapnia ([facility])  Resolved Problems:    Opiate overdose ([facility]      UPDATED PLAN:  See current orders  Home oxygen to be arranged    Face to face encounter today indicate the requirement of DME order of  Oxygen for the diagnosis of the  Hypoxia Indication and side effects of treatment had been addressed with pt , pt agreeable to the current plan of using the DME    DCP 33 min+    IP CONSULT TO INTERNAL MEDICINE  IP CONSULT TO [address] [place] [address]  7/6/2020  12:40 PM

## 2020-07-06 NOTE — PROGRESS NOTES
Occupational Therapy   Occupational Therapy Initial Assessment  Date: 2020   Patient Name: Berkley Hankins  MRN: [id number]     : 1980    Date of Service: 2020    Discharge Recommendations:  Home with assist PRN(pt planning to go to drug rehab)  OT Equipment Recommendations  Equipment Needed: No  RN reports patient is medically stable for therapy treatment this date. Chart reviewed prior to treatment and patient is agreeable for therapy. All lines intact and patient positioned comfortably at end of treatment. All patient needs addressed prior to ending therapy session. Assessment   Performance deficits / Impairments: Decreased endurance  Assessment: pt seen for eval day only, no further OT treatment warranted  Prognosis: Good  Decision Making: Low Complexity  OT Education: OT Role;Plan of Care  Patient Education: pursed lip breathing, posture, and general safety  No Skilled OT: Safe to return home  REQUIRES OT FOLLOW UP: No  Activity Tolerance  Activity Tolerance: Patient Tolerated treatment well  Safety Devices  Safety Devices in place: Yes  Type of devices: Call light within reach;Nurse notified; Left in bed           Patient Diagnosis(es): The primary encounter diagnosis was Opiate overdose, accidental or unintentional, initial encounter ([facility] Diagnoses of Accidental fentanyl overdose, initial encounter ([address] and Hypoxia were also pertinent to this visit. has a past medical history of ADHD (attention deficit hyperactivity disorder), Anxiety, Bipolar disorder ([address] Depression, GERD (gastroesophageal reflux disease), Irritable bowel syndrome, and Substance abuse ([address] has a past surgical history that includes Adenoidectomy; Hydrocele surgery (Left); Colonoscopy; Leg Surgery; fracture surgery; [place] tooth extraction; and Dental surgery.            Restrictions  Restrictions/Precautions  Restrictions/Precautions: General Precautions  Position Activity Restriction  Other position/activity restrictions: O2-2L- monitor sats    Subjective   General  Chart Reviewed: Yes  Patient assessed for rehabilitation services?: Yes  Family / Caregiver Present: No  General Comment  Comments: pt is very tearful during eval. Pt states his brother just  and per chart, pt had been clean since  and relapsed a week ago. Vital Signs  Temp: 97.7 °F (36.5 °C)  Temp Source: Temporal  Pulse: 76  Heart Rate Source: Telemetry  Resp: 16  BP: (!) 141/72  BP Location: Left upper arm  BP Upper/Lower: Upper  MAP (mmHg): 90  Patient Position: Semi fowlers  Oxygen Therapy  SpO2: 97 %  O2 Device: None (Room air)  Social/Functional History  Social/Functional History  Lives With: Family(lives with parents. States his dad is handicapped (has a leg ampuation). 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 Monitor O2 sats (per RN, levels dropping with mob). Pt remained in 90's both at rest and with mob in room/hallway. Balance  Sitting Balance: Independent  Standing Balance: Stand by assistance  Standing Balance  Activity: pt c/o dizziness with mob, cued to take rest break standing to assure symptoms not worsening. Functional Mobility  Functional - Mobility Device: No device  Assist Level: Stand by assistance  Functional Mobility Comments: pt completed functional mob in room and into hallway. O2 sats dropping to 89% but quickly recovering to low 90's once sitting.  Pt cued on pursed lip breathing and posture. Pt very emotional during session and holding head down in tears. Pt provided reassurance and validation for his current challenges. Toilet Transfers  Equipment Used: Standard toilet  Toilet Transfer: Independent;Supervision  ADL  Feeding: Independent  Grooming: Independent  UE Bathing: Independent  LE Bathing: Stand by assistance  UE Dressing: Independent  LE Dressing: Stand by assistance  Toileting: Supervision  Additional Comments: pt SBA for standing portions d/t slight dizziness reported. Pt educated on pursed lip breathing as well to assure adequate O2 sats, tomás with exertion. Pt using O2 for mob as sats had been dropping with nursing when up. Tone RUE  RUE Tone: Normotonic  Tone LUE  LUE Tone: Normotonic  Coordination  Movements Are Fluid And Coordinated: Yes     Bed mobility  Comment: sitting at EOB  Transfers  Sit to stand: Supervision; Independent  Stand to sit: Supervision; Independent     Cognition  Overall Cognitive Status: WNL  Perception  Overall Perceptual Status: WFL     Sensation  Overall Sensation Status: WNL        LUE AROM (degrees)  LUE AROM : WFL  RUE AROM (degrees)  RUE AROM : WFL  LUE Strength  Gross LUE Strength: WFL  RUE Strength  Gross RUE Strength: WFL                   Plan   Plan  Times per week: 1 visit with mercedes  Current Treatment Recommendations: Patient/Caregiver Education & Training, Endurance Training      Goals  Short term goals  Time Frame for Short term goals: 1 visit with eval  Short term goal 1: pt will demo and verb good understanding of ed provided on pacing and breathing techs with mob/ADLs as well as general safety precautions  Patient Goals   Patient goals : pt would like to go to drug rehab program       Therapy Time   Individual Concurrent Group Co-treatment   Time In 1215         Time Out 1239         Minutes [address]

## 2020-07-06 NOTE — PROGRESS NOTES
Physical Therapy    Facility/Department: [facility]  Initial Assessment    NAME: Brain Boast  : 1980  MRN: [id number]    Date of Service: 2020    Discharge Recommendations:  Home with assist PRN(RN states possible d/c to IP drug rehab. )     Brain Boast is a 38 y.o. Non-/non  male who presents with Drug Overdose   and is admitted to the hospital for the management of Hypoxia.     The patient reports to the hospital with complaint of drug overdose. He states he injected fentanyl today. He has little recollection of the event, but states that he injected fentanyl earlier yesterday. He was reportedly found unresponsive by his family, who called EMS. He was given narcan with improvement in mentation. However, he was found to be hypoxic in the ER with spo2 levels of 78% while ambulating    Assessment   Body structures, Functions, Activity limitations: Decreased balance  Assessment: Pt. with some dizziness with amb. during eval.  Will continue to monitor to ensure pt. is safe for mobility with no dizziness. Prognosis: Excellent  Decision Making: Medium Complexity  PT Education: Goals;PT Role;Plan of Care  REQUIRES PT FOLLOW UP: Yes  Activity Tolerance  Activity Tolerance: (Limited by emotional state/ tearful t/o eval)       Patient Diagnosis(es): The primary encounter diagnosis was Opiate overdose, accidental or unintentional, initial encounter ([address] Diagnoses of Accidental fentanyl overdose, initial encounter ([address] and Hypoxia were also pertinent to this visit. has a past medical history of ADHD (attention deficit hyperactivity disorder), Anxiety, Bipolar disorder ([address] Depression, GERD (gastroesophageal reflux disease), Irritable bowel syndrome, and Substance abuse ([address] has a past surgical history that includes Adenoidectomy; Hydrocele surgery (Left); Colonoscopy;  Leg Surgery; fracture surgery; [place] tooth extraction; and Dental surgery. Restrictions  Restrictions/Precautions  Restrictions/Precautions: General Precautions, Up as Tolerated, Fall Risk  Position Activity Restriction  Other position/activity restrictions: O2-2L- monitor sats  Vision/Hearing  Vision: Within Functional Limits  Hearing: Within functional limits     Subjective  General  Chart Reviewed: Yes  Patient assessed for rehabilitation services?: Yes  Family / Caregiver Present: No  Follows Commands: Within Functional Limits  Pain Screening  Patient Currently in Pain: Yes          Orientation  Orientation  Overall Orientation Status: Within Functional Limits  Social/Functional History  Social/Functional History  Lives With: Family(lives with parents. States his dad is handicapped (has a leg ampuation). 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 Monitor O2 sats (per RN, levels dropping with mob). Pt remained in 90's both at rest and with mob in room/hallway. ;  tearful t/o session -states his brother recently passed away and he is \"not sure is has a home to return to\"    AROM RLE (degrees)  RLE AROM: WFL  AROM LLE (degrees)  LLE AROM : WFL  AROM RUE (degrees)  RUE AROM : WFL  AROM LUE (degrees)  LUE AROM : WFL  Strength RLE  Strength RLE: WFL  Strength LLE  Strength LLE: WFL  Strength RUE  Strength RUE: WFL  Strength LUE  Strength LUE: WFL  Tone RLE  RLE Tone: Normotonic  Tone LLE  LLE Tone: Normotonic  Sensation  Overall Sensation Status: WNL  Bed mobility  Supine to Sit: Independent  Sit to Supine: Independent  Transfers  Sit to Stand: Contact guard assistance  Stand to sit: Contact guard assistance  Ambulation  Ambulation?: Yes  Ambulation 1  Surface: level tile  Device: No Device  Assistance: Contact guard assistance  Distance: 100ft. Comments: Pt. became mildly dizzy during gait- able to make it back to room and sat EOB/ dizziness subsided. Pt. felt possibly due to wearing mask. Balance  Posture: Good  Sitting - Static: Good  Sitting - Dynamic: Good  Standing - Static: Good        Plan   Plan  Times per week: 1-2x/day; 5-6days/wk  Current Treatment Recommendations: Balance Training, Functional Mobility Training, Gait Training, Patient/Caregiver Education & Training  Safety Devices  Type of devices: Gait belt, All fall risk precautions in place, Patient at risk for falls, Nurse notified, Call light within reach(left sitting EOB to eat lunch)      Goals  Short term goals  Time Frame for Short term goals: 3 visits:  Short term goal 1: Pt. to be indep with gait with no dizziness, 300ft. Short term goal 2: Pt. to be indep/ good+ with standing dynamic mobility  Patient Goals   Patient goals : d/c to Drug Rehab. Therapy Time   Individual Concurrent Group Co-treatment   Time In 1200         Time Out 1225         Minutes 25              treatment time: 13min.   Ronda Patel, PT

## 2020-07-06 NOTE — CARE COORDINATION
Social work: phone call from [facility] patient has been accepted and can admit tonight. Writer attempted to schedule via [facility] [phone], only take reservations until 5:00PM.   sw arranged transport via [employer] at 7:00PM.    Pt and RN informed.

## 2020-07-06 NOTE — H&P
[facility]    HISTORY AND PHYSICAL EXAMINATION            Date:   7/6/2020  Patient name:  Pratibha Anna  Date of admission:  7/5/2020  5:57 PM  MRN:   [id number]  Account:  [de-identified]  YOB: 1980  PCP:    Junior Jose Antonio MD  Room:   2023/5696-24  Code Status:    Full Code    Chief Complaint:     Chief Complaint   Patient presents with    Drug Overdose     History Obtained From:     Patient and electronic medical record. History of Present Illness:     Pratibha Anna is a 38 y.o. Non-/non  male who presents with Drug Overdose   and is admitted to the hospital for the management of Hypoxia. The patient reports to the hospital with complaint of drug overdose. He states he injected fentanyl today. He has little recollection of the event, but states that he injected fentanyl earlier yesterday. He was reportedly found unresponsive by his family, who called EMS. He was given narcan with improvement in mentation. However, he was found to be hypoxic in the ER with spo2 levels of 78% while ambulating. His oxygen saturations improved satisfactorily with supplemental oxygen. The patient endorses chronic abdominal pain that he states is generalized, moderate, dull and intermittent. He also reports resolved nausea and vomiting. He has a small area of erythema to his left forearm, he states he injected drugs to this area 2-3 days ago. He reports feeling anxious. He denies fever or chills. No additional symptomology or modifying factors. He denies suicidal ideation and states the overdose was accidental. He has past medical history that includes IBS, GERD, ADHD, bipolar disorder, anxiety and depression. He is a current every day cigarette smoker. Rapid SARS-CoV-2 negative. D dimer 0.74. CTA chest indicates no evidence of pulmonary embolism or acute pulmonary abnormality.      Past Medical History:     Past Medical History: Diagnosis Date    ADHD (attention deficit hyperactivity disorder)     Anxiety     Bipolar disorder (HCC)     Depression     GERD (gastroesophageal reflux disease)     Irritable bowel syndrome     Substance abuse (HCC)       Past Surgical History:     Past Surgical History:   Procedure Laterality Date    ADENOIDECTOMY      COLONOSCOPY      DENTAL SURGERY      FRACTURE SURGERY      HYDROCELE EXCISION Left     LEG SURGERY      WISDOM TOOTH EXTRACTION        Medications Prior to Admission:     Prior to Admission medications    Medication Sig Start Date End Date Taking? Authorizing Provider   amphetamine-dextroamphetamine (ADDERALL, 20MG,) 20 MG tablet Take 1 tablet by mouth 3 times daily for 30 days. 8/1/19 8/31/19  Vidya Bella MD   venlafaxine (EFFEXOR XR) 75 MG extended release capsule Take 3 capsules by mouth daily 7/31/19   Vidya Bella MD   tamsulosin [facility]) 0.4 MG capsule Take 2 capsules by mouth daily 7/31/19   Vidya Bella MD   lisdexamfetamine (VYVANSE) 70 MG capsule Take 1 capsule by mouth every morning for 30 days. 7/31/19 8/30/19  Vidya eBlla MD   venlafaxine (EFFEXOR XR) 150 MG extended release capsule Take 1 capsule by mouth daily 7/1/19   Vidya Bella MD      Allergies:     Sulfa antibiotics    Social History:     Tobacco:    reports that he has been smoking cigarettes. He has a 25.00 pack-year smoking history. He has never used smokeless tobacco.  Alcohol:      reports current alcohol use. Drug Use:  reports current drug use. Drug: Marijuana. Family History:     Family History   Problem Relation Age of Onset    Depression Mother     Hypertension Father      Review of Systems:     Positive and Negative as described in HPI. Review of Systems   Constitutional: Negative for chills, diaphoresis and fever. HENT: Negative for congestion. Eyes: Negative for visual disturbance.    Respiratory: Negative for cough, chest tightness, shortness of breath and wheezing. Cardiovascular: Negative for chest pain, palpitations and leg swelling. Gastrointestinal: Positive for abdominal pain, nausea and vomiting. Negative for blood in stool, constipation and diarrhea. Genitourinary: Negative for difficulty urinating. Musculoskeletal: Negative for arthralgias and myalgias. Skin: Positive for color change. Negative for rash. Neurological: Negative for dizziness, weakness, light-headedness, numbness and headaches. Psychiatric/Behavioral: The patient is nervous/anxious. All other systems reviewed and are negative. Physical Exam:   BP (!) 144/89   Pulse 67   Temp 97.1 °F (36.2 °C) (Temporal)   Resp 16   Ht 6' 5\" (1.956 m)   Wt 210 lb (95.3 kg)   SpO2 95%   BMI 24.90 kg/m²   Temp (24hrs), Av.9 °F (36.6 °C), Min:97.1 °F (36.2 °C), Max:98.7 °F (37.1 °C)    No results for input(s): POCGLU in the last 72 hours. Intake/Output Summary (Last 24 hours) at 2020 0542  Last data filed at 2020 0430  Gross per 24 hour   Intake 350 ml   Output --   Net 350 ml       Physical Exam  Vitals signs and nursing note reviewed. Constitutional:       General: He is not in acute distress. Appearance: He is not diaphoretic. HENT:      Head: Normocephalic and atraumatic. Right Ear: Hearing normal.      Left Ear: Hearing normal.      Nose: Nose normal. No rhinorrhea. Eyes:      General: Lids are normal.      Extraocular Movements:      Right eye: Normal extraocular motion. Left eye: Normal extraocular motion. Conjunctiva/sclera: Conjunctivae normal.      Right eye: Right conjunctiva is not injected. Left eye: Left conjunctiva is not injected. Pupils: Pupils are equal, round, and reactive to light. Pupils are equal.      Right eye: Pupil is reactive. Left eye: Pupil is reactive. Neck:      Musculoskeletal: Neck supple. Thyroid: No thyromegaly. Vascular: No carotid bruit.       Trachea: Trachea and phonation normal. No SARS-CoV-2          SARS-CoV-2, Rapid Not Detected Not Detected    Source . NASOPHARYNGEAL SWAB     SARS-CoV-2, PCR             Imaging/Diagnostics:  Xr Chest Portable    Result Date: 7/5/2020  No acute airspace disease identified. Ct Chest Pulmonary Embolism W Contrast    Result Date: 7/6/2020  No evidence of pulmonary embolism or acute pulmonary abnormality. Assessment :      Hospital Problems           Last Modified POA    * (Principal) Hypoxia 7/6/2020 Yes    Accidental fentanyl overdose ([facility] 7/6/2020 Yes    Tobacco dependence 7/6/2020 Yes    Substance abuse ([facility] [address] 7/6/2020 Yes    Cellulitis of left upper extremity 7/6/2020 Yes    GERD (gastroesophageal reflux disease) 7/6/2020 Yes        Plan:     Patient status inpatient in the  Progressive Unit/Step down    1. Consult IR for PIV or midline placement, RN unable to obtain IV access. 2. Hypoxia- supplemental oxygen, wean as tolerated. 3. Accidental fentanyl overdose- IV hydration. 4. Substance abuse- would benefit from outpatient substance abuse treatment. 5. Cellulitis- keflex. 6. Tobacco dependence- smoking cessation. 7. GERD- famotidine. 8. Antiemetics. 9. Urine toxicology screen. 10. DVT prophylaxis. 11. Follow chemistries. 12. Monitor vital signs. 13. General diet. 14. Activity as tolerated with assist.     Plan of care discussed with patient and Shelia Tabor. Consultations:   IP CONSULT TO INTERNAL MEDICINE  IP CONSULT TO SOCIAL WORK    Patient is admitted as inpatient status because of co-morbidities listed above, severity of signs and symptoms as outlined, requirement for current medical therapies and most importantly because of direct risk to patient if care not provided in a hospital setting.     SHILO Ramachandran - CNP  7/6/2020  5:42 AM    Copy sent to Dr. Julianna Phipps MD     (Please note that portions of this note were completed with a voice recognition program. Efforts were made to edit the dictations but occasionally words are mis-transcribed.)

## 2020-07-06 NOTE — CARE COORDINATION
Social work: Phone call from [place], states patient re-tested and was 93% on room air therefore does not need oxygen at dc. GARO contacted [facility] and informed Crystal/admissions of this, she will have physician review and call sw back.

## 2020-07-06 NOTE — PROGRESS NOTES
Home Oxygen Evaluation done at [facility]     Home Oxygen Evaluation completed. Patient is on 2 liters per minute via cannula. Resting SpO2 = 99%  Resting SpO2 on room air = 95%    SpO2 on room air with exercise = 83%  ( ambulated  400 ft)  SpO2 on oxygen as above with exercise = 97%    Nocturnal Oximetry with patient on room air is recommended is SpO2 is between 89% and 95% (requires additional order).     Kevin Yan  11:31 AM

## 2020-07-06 NOTE — CARE COORDINATION
Case Management Initial Discharge Plan  Jocelyne Basilio,         Readmission Risk              Risk of Unplanned Readmission:        11             Met with:patient to discuss discharge plans. Information verified: address, contacts, phone number, , insurance Yes  PCP: Octavia Smith MD  Date of last visit:     Insurance Provider: [facility] medicaid    Discharge Planning  Current Residence:  Lives with father  Living Arrangements:  Parent   Home has  stories/ stairs to climb  Support Systems:  Family Members  Current Services PTA:   Agency:   Patient able to perform ADL's:Independent  DME in home:    DME used to aid ambulation prior to admission:     DME used during admission:      Potential Assistance Needed:  N/A    Pharmacy:    Potential Assistance Purchasing Medications:  Yes  Does patient want to participate in local refill/ meds to beds program?  Yes    Patient agreeable to home care: No  [place] of choice provided:  n/a      Type of Home Care Services:  None  Patient expects to be discharged to:  home v rehab    Prior SNF/Rehab Placement and Facility: none  Agreeable to SNF/Rehab: No  [place] of choice provided: n/a   Evaluation: no    Expected Discharge date:  20  Follow Up Appointment: Best Day/ Time:      Transportation provider:   Transportation arrangements needed for discharge: Yes    Discharge Plan: Social work: Met with patient to discuss inpatient drug rehab placement. States he went to [facility] in - was cut by insurance after 3 days. He would be agreeable to [facility] again, states he stopped using in  and relapsed 1 week ago. GARO spoke with admissions/[facility] and referral faxed to [phone]- will need approval by medical director at [facility].

## 2020-07-08 ENCOUNTER — TELEPHONE (OUTPATIENT)
Dept: FAMILY MEDICINE CLINIC | Age: 40
End: 2020-07-08

## 2020-07-08 NOTE — TELEPHONE ENCOUNTER
Jatin[facility] Transitions Initial Follow Up Call    Outreach made within 2 business days of discharge: Yes    Patient: Eugene Alvarez Patient : 1980   MRN: [id number]  Reason for Admission: There are no discharge diagnoses documented for the most recent discharge.   Discharge Date: 20         PATIENT NOT EST HERE  Sheila Ferguson

## 2020-12-16 ENCOUNTER — HOSPITAL ENCOUNTER (OUTPATIENT)
Age: 40
Setting detail: OUTPATIENT SURGERY
Discharge: HOME | End: 2020-12-16
Attending: UROLOGY | Admitting: UROLOGY
Payer: MEDICAID

## 2020-12-16 VITALS
DIASTOLIC BLOOD PRESSURE: 82 MMHG | HEIGHT: 77 IN | BODY MASS INDEX: 24.71 KG/M2 | OXYGEN SATURATION: 99 % | TEMPERATURE: 97.3 F | HEART RATE: 82 BPM | SYSTOLIC BLOOD PRESSURE: 131 MMHG | WEIGHT: 209.25 LBS

## 2020-12-16 PROCEDURE — 3600000012 HC SURGERY LEVEL 2 ADDTL 15MIN: Performed by: UROLOGY

## 2020-12-16 PROCEDURE — 7100000010 HC PHASE II RECOVERY - FIRST 15 MIN: Performed by: UROLOGY

## 2020-12-16 PROCEDURE — 2709999900 HC NON-CHARGEABLE SUPPLY: Performed by: UROLOGY

## 2020-12-16 PROCEDURE — 3600000002 HC SURGERY LEVEL 2 BASE: Performed by: UROLOGY

## 2020-12-16 PROCEDURE — 6370000000 HC RX 637 (ALT 250 FOR IP): Performed by: UROLOGY

## 2020-12-16 RX ORDER — CIPROFLOXACIN 500 MG/1
500 TABLET, FILM COATED ORAL ONCE
Status: COMPLETED | OUTPATIENT
Start: 2020-12-16 | End: 2020-12-16

## 2020-12-16 RX ORDER — LIDOCAINE HYDROCHLORIDE 20 MG/ML
JELLY TOPICAL PRN
Status: DISCONTINUED | OUTPATIENT
Start: 2020-12-16 | End: 2020-12-16 | Stop reason: ALTCHOICE

## 2020-12-16 RX ORDER — TAMSULOSIN HYDROCHLORIDE 0.4 MG/1
0.4 CAPSULE ORAL DAILY
Qty: 30 CAPSULE | Refills: 5 | Status: SHIPPED | OUTPATIENT
Start: 2020-12-16 | End: 2021-01-15

## 2020-12-16 RX ORDER — CIPROFLOXACIN 500 MG/1
500 TABLET, FILM COATED ORAL ONCE
Qty: 1 TABLET | Refills: 0 | Status: SHIPPED | OUTPATIENT
Start: 2020-12-16 | End: 2020-12-16

## 2020-12-16 RX ADMIN — CIPROFLOXACIN 500 MG: 500 TABLET ORAL at 09:03

## 2020-12-16 ASSESSMENT — PULMONARY FUNCTION TESTS: PIF_VALUE: 0

## 2020-12-16 ASSESSMENT — PAIN DESCRIPTION - DESCRIPTORS: DESCRIPTORS: CRAMPING;ACHING

## 2020-12-16 ASSESSMENT — PAIN - FUNCTIONAL ASSESSMENT: PAIN_FUNCTIONAL_ASSESSMENT: 0-10

## 2020-12-16 ASSESSMENT — PAIN SCALES - GENERAL: PAINLEVEL_OUTOF10: 0

## 2020-12-16 NOTE — OP NOTE
[id number] [facility] [address]                [address] [place] [address]                                OPERATIVE REPORT    PATIENT NAME: Keo Noyola                         :        1980  MED REC NO:   [id number]                             ROOM:  ACCOUNT NO:   [de-identified]                           ADMIT DATE: 2020  PROVIDER:     Maryjo Herrera    DATE OF PROCEDURE:  2020    PREOPERATIVE DIAGNOSES:  1. BPH.  2.  Urgency. 3.  Nocturia. POSTOPERATIVE DIAGNOSES:  1. BPH.  2.  Urgency. 3.  Nocturia. OPERATION PERFORMED:  1. Cystoscopy. 2.  EMG with uroflow. SURGEON:  Dr. Maryjo Herrera    ANESTHESIA:  Local.      EBL:  Zero    INDICATIONS:  The patient is a 71-year-old male. He was seen as a new  patient on 2020. He went to [facility] on 2020  concerning that he may have a kidney stone. CT scan showed no evidence  of a stone. He had splenomegaly and upper abdominal lymphadenopathy. He has a history of a prior lymph node biopsy that was negative for  carcinoma. He has a history of hepatitis C and that has not been  treated as he has not followed up with GI. He was sent to Hematology  and plans were in place to get him back to see GI. He has urinary  symptoms of urgency, difficulty voiding. IPSS score is 27. He uses  Flomax periodically. A PSA was checked, which was 0.7. He presents  today for the above procedure. OPERATIVE PROCEDURE:  He was taken to the operating room. He could not  void initially. His genitals were prepped. Straight catheterization  was performed with no residual.  His bladder was filled at a rate of 100  mL of saline per minute. His cystometric capacity was enlarged at 631.8  mL. The detrusor pressure elevated at 84 cm of water pressure. EMG was  synergistic with the bladder neck. No uninhibited contractions noted. He then did a uroflow study and voided 347.5 mL. Max flow rate 6.9  mL/second, residual urine 263 mL. He was taken to the Cystoscopy Suite  and put in a supine position. His genitals were prepped and 2%  lidocaine jelly was injected into the urethra. The flexible cystoscope  was inserted. The findings revealed no strictures. The prostatic  urethra was entered. It was short, 2 cm, and nonobstructive. Bladder  neck was patent. The bladder was entered. Findings revealed  unremarkable bladder urothelium. There were no tumors. Orifices had  efflux of clear urine. There was no median lobe protrusion. Minimal  trabeculation was noted. There were no diverticula. He tolerated it  well. PLAN:  The patient does have an enlarged bladder capacity. I  recommended timed and double voiding. He can take Flomax to help with  bladder emptying. No need for surgical intervention. Return in one  year.         Cuba Kohler    D: 12/16/2020 9:02:58       T: 12/16/2020 11:37:03     [email or web address]  Job#: [id number]     Doc#: [id number]    CC:

## 2020-12-16 NOTE — H&P
History and Physical Update    Pt Name: Annetta Mckeznie  MRN: [id number]  YOB: 1980  Date of evaluation: 12/16/2020      [x] I have reviewed the hardcopy Progress Note by Dr Arelis Singh dated 12/3/20 labeled in short chart which meets the criteria for an Interval History and Physical note. [x] I have examined  Annetta Mckenzie  There are no changes to the patient who is scheduled for a local cystoscopy by Dr Leeanna Wheeler for urinary urgency nocturia. The patient continues to c/o hesitancy, urgency with incontinence and nocturia. He denies new health changes, fever, chills, wheezing, cough, increased SOB, chest pain, open sores or wounds. Past Medical History:     Past Medical History:   Diagnosis Date    ADHD (attention deficit hyperactivity disorder)     Anxiety     Bipolar disorder (HCC)     Depression     GERD (gastroesophageal reflux disease)     Irritable bowel syndrome     Substance abuse ([facility] [address]         Past Surgical History:     Past Surgical History:   Procedure Laterality Date    ADENOIDECTOMY      COLONOSCOPY      DENTAL SURGERY      FRACTURE SURGERY      HYDROCELE EXCISION Left     LEG SURGERY      WISDOM TOOTH EXTRACTION          Social History:     Tobacco:    reports that he has been smoking cigarettes. He has a 25.00 pack-year smoking history. He has never used smokeless tobacco.  Alcohol:      reports current alcohol use. Drug Use:  reports current drug use. Drug: Marijuana. Family History:     Family History   Problem Relation Age of Onset    Depression Mother     Hypertension Father        Vital signs: /88   Pulse 99   Temp 97 °F (36.1 °C) (Temporal)   Resp 18   SpO2 97%     Allergies:  Sulfa antibiotics    Medications:    Prior to Admission medications    Medication Sig Start Date End Date Taking? Authorizing Provider   amphetamine-dextroamphetamine (ADDERALL, 20MG,) 20 MG tablet Take 1 tablet by mouth 3 times daily for 30 days.  8/1/19 8/31/19  Stan Zarcosbury Satya Daugherty MD   venlafaxine (EFFEXOR XR) 75 MG extended release capsule Take 3 capsules by mouth daily 7/31/19   Pily Casey MD   tamsulosin [facility]) 0.4 MG capsule Take 2 capsules by mouth daily 7/31/19   Pily Casey MD   lisdexamfetamine (VYVANSE) 70 MG capsule Take 1 capsule by mouth every morning for 30 days. 7/31/19 8/30/19  Pily Casey MD         This is a 38 y.o. male who is pleasant, cooperative, alert and oriented x3, in no acute distress. Heart: Heart sounds are normal.  HR 99 regular rate and rhythm without murmur, gallop or rub. Lungs: Normal respiratory effort with equal expansion, good air exchange, unlabored and clear to auscultation without wheezes or rales bilaterally   Abdomen: soft, nontender, nondistended with bowel sounds . Labs:  No results for input(s): HGB, HCT, WBC, MCV, PLT, NA, K, CL, CO2, BUN, CREATININE, GLUCOSE, INR, PROTIME, APTT, AST, ALT, LABALBU, HCG in the last 720 hours. No results for input(s): COVID19 in the last 720 hours.     JOSE Hylton-BC  Electronically signed 12/16/2020 at 7:33 AM

## (undated) DEVICE — Z DUP USE 2522782 SOLUTION IRRIG 1000ML STRL H2O PLAS CONTAINER UROMATIC

## (undated) DEVICE — TOWEL,OR,DSP,ST,BLUE,DLX,XR,4/PK,20PK/CS: Brand: MEDLINE

## (undated) DEVICE — GLOVE SURG SZ 65 CRM LTX FREE POLYISOPRENE POLYMER BEAD ANTI